# Patient Record
Sex: FEMALE | Race: WHITE | Employment: FULL TIME | URBAN - NONMETROPOLITAN AREA
[De-identification: names, ages, dates, MRNs, and addresses within clinical notes are randomized per-mention and may not be internally consistent; named-entity substitution may affect disease eponyms.]

---

## 2017-01-23 ENCOUNTER — OFFICE VISIT (OUTPATIENT)
Dept: FAMILY MEDICINE CLINIC | Age: 57
End: 2017-01-23

## 2017-01-23 ENCOUNTER — APPOINTMENT (OUTPATIENT)
Dept: GENERAL RADIOLOGY | Age: 57
End: 2017-01-23
Payer: COMMERCIAL

## 2017-01-23 ENCOUNTER — HOSPITAL ENCOUNTER (EMERGENCY)
Age: 57
Discharge: HOME | End: 2017-01-23
Attending: EMERGENCY MEDICINE
Payer: COMMERCIAL

## 2017-01-23 VITALS
WEIGHT: 186 LBS | HEART RATE: 120 BPM | HEIGHT: 66 IN | BODY MASS INDEX: 29.89 KG/M2 | RESPIRATION RATE: 18 BRPM | OXYGEN SATURATION: 97 % | TEMPERATURE: 97.4 F | DIASTOLIC BLOOD PRESSURE: 72 MMHG | SYSTOLIC BLOOD PRESSURE: 115 MMHG

## 2017-01-23 VITALS
DIASTOLIC BLOOD PRESSURE: 78 MMHG | HEIGHT: 66 IN | OXYGEN SATURATION: 98 % | HEART RATE: 125 BPM | BODY MASS INDEX: 29.99 KG/M2 | WEIGHT: 186.6 LBS | SYSTOLIC BLOOD PRESSURE: 98 MMHG | TEMPERATURE: 98.6 F

## 2017-01-23 DIAGNOSIS — R00.0 TACHYCARDIA: ICD-10-CM

## 2017-01-23 DIAGNOSIS — R53.83 OTHER FATIGUE: ICD-10-CM

## 2017-01-23 DIAGNOSIS — E86.0 DEHYDRATION: Primary | ICD-10-CM

## 2017-01-23 DIAGNOSIS — J18.9 COMMUNITY ACQUIRED PNEUMONIA: Primary | ICD-10-CM

## 2017-01-23 DIAGNOSIS — R19.7 DIARRHEA, UNSPECIFIED TYPE: ICD-10-CM

## 2017-01-23 LAB
ALBUMIN SERPL-MCNC: 3.9 G/DL (ref 3.9–4.9)
ALP BLD-CCNC: 110 U/L (ref 40–130)
ALT SERPL-CCNC: 10 U/L (ref 0–33)
ANION GAP SERPL CALCULATED.3IONS-SCNC: 19 MEQ/L (ref 7–13)
AST SERPL-CCNC: 14 U/L (ref 0–35)
BILIRUB SERPL-MCNC: 0.4 MG/DL (ref 0–1.2)
BUN BLDV-MCNC: 19 MG/DL (ref 6–20)
CALCIUM SERPL-MCNC: 9.9 MG/DL (ref 8.6–10.2)
CHLORIDE BLD-SCNC: 89 MEQ/L (ref 98–107)
CO2: 28 MEQ/L (ref 22–29)
CREAT SERPL-MCNC: 1.69 MG/DL (ref 0.5–0.9)
GFR AFRICAN AMERICAN: 37.8
GFR NON-AFRICAN AMERICAN: 31.2
GLOBULIN: 3.4 G/DL (ref 2.3–3.5)
GLUCOSE BLD-MCNC: 123 MG/DL (ref 74–109)
HCT VFR BLD CALC: 34.9 % (ref 37–47)
HEMOGLOBIN: 11.6 G/DL (ref 12–16)
MCH RBC QN AUTO: 29.9 PG (ref 27–31.3)
MCHC RBC AUTO-ENTMCNC: 33.3 % (ref 33–37)
MCV RBC AUTO: 89.5 FL (ref 82–100)
PDW BLD-RTO: 13.2 % (ref 11.5–14.5)
PLATELET # BLD: 612 K/UL (ref 130–400)
POTASSIUM SERPL-SCNC: 4.1 MEQ/L (ref 3.5–5.1)
RAPID INFLUENZA  B AGN: NEGATIVE
RAPID INFLUENZA A AGN: NEGATIVE
RBC # BLD: 3.9 M/UL (ref 4.2–5.4)
SODIUM BLD-SCNC: 136 MEQ/L (ref 132–144)
TOTAL PROTEIN: 7.3 G/DL (ref 6.4–8.1)
WBC # BLD: 11.9 K/UL (ref 4.8–10.8)

## 2017-01-23 PROCEDURE — 96365 THER/PROPH/DIAG IV INF INIT: CPT

## 2017-01-23 PROCEDURE — 2580000003 HC RX 258: Performed by: PHYSICIAN ASSISTANT

## 2017-01-23 PROCEDURE — 6360000002 HC RX W HCPCS: Performed by: PHYSICIAN ASSISTANT

## 2017-01-23 PROCEDURE — 85027 COMPLETE CBC AUTOMATED: CPT

## 2017-01-23 PROCEDURE — 36415 COLL VENOUS BLD VENIPUNCTURE: CPT

## 2017-01-23 PROCEDURE — 71020 XR CHEST STANDARD TWO VW: CPT

## 2017-01-23 PROCEDURE — 86403 PARTICLE AGGLUT ANTBDY SCRN: CPT

## 2017-01-23 PROCEDURE — 99213 OFFICE O/P EST LOW 20 MIN: CPT | Performed by: NURSE PRACTITIONER

## 2017-01-23 PROCEDURE — 99283 EMERGENCY DEPT VISIT LOW MDM: CPT

## 2017-01-23 PROCEDURE — 80053 COMPREHEN METABOLIC PANEL: CPT

## 2017-01-23 PROCEDURE — 96375 TX/PRO/DX INJ NEW DRUG ADDON: CPT

## 2017-01-23 RX ORDER — ONDANSETRON 2 MG/ML
4 INJECTION INTRAMUSCULAR; INTRAVENOUS ONCE
Status: COMPLETED | OUTPATIENT
Start: 2017-01-23 | End: 2017-01-23

## 2017-01-23 RX ORDER — 0.9 % SODIUM CHLORIDE 0.9 %
1000 INTRAVENOUS SOLUTION INTRAVENOUS ONCE
Status: COMPLETED | OUTPATIENT
Start: 2017-01-23 | End: 2017-01-23

## 2017-01-23 RX ORDER — LEVOFLOXACIN 250 MG/1
250 TABLET ORAL DAILY
Qty: 10 TABLET | Refills: 0 | Status: SHIPPED | OUTPATIENT
Start: 2017-01-23 | End: 2017-02-02

## 2017-01-23 RX ORDER — LEVOFLOXACIN 5 MG/ML
500 INJECTION, SOLUTION INTRAVENOUS ONCE
Status: COMPLETED | OUTPATIENT
Start: 2017-01-23 | End: 2017-01-23

## 2017-01-23 RX ORDER — LEVOFLOXACIN 500 MG/1
500 TABLET, FILM COATED ORAL DAILY
Qty: 7 TABLET | Refills: 0 | Status: SHIPPED | OUTPATIENT
Start: 2017-01-23 | End: 2017-01-23

## 2017-01-23 RX ADMIN — LEVOFLOXACIN 500 MG: 5 INJECTION, SOLUTION INTRAVENOUS at 19:59

## 2017-01-23 RX ADMIN — ONDANSETRON 4 MG: 2 INJECTION, SOLUTION INTRAMUSCULAR; INTRAVENOUS at 19:04

## 2017-01-23 RX ADMIN — SODIUM CHLORIDE 1000 ML: 900 INJECTION, SOLUTION INTRAVENOUS at 19:04

## 2017-01-23 ASSESSMENT — ENCOUNTER SYMPTOMS
WHEEZING: 0
VOMITING: 1
SHORTNESS OF BREATH: 0
CHANGE IN BOWEL HABIT: 1
STRIDOR: 0
SHORTNESS OF BREATH: 1
DIARRHEA: 1
NAUSEA: 1
COUGH: 1
COUGH: 1
CHEST TIGHTNESS: 0

## 2017-01-25 ENCOUNTER — PATIENT MESSAGE (OUTPATIENT)
Dept: FAMILY MEDICINE CLINIC | Age: 57
End: 2017-01-25

## 2017-01-26 RX ORDER — PRAVASTATIN SODIUM 40 MG
40 TABLET ORAL DAILY
Qty: 90 TABLET | Refills: 2 | Status: SHIPPED | OUTPATIENT
Start: 2017-01-26 | End: 2017-11-30

## 2017-02-08 ENCOUNTER — OFFICE VISIT (OUTPATIENT)
Dept: FAMILY MEDICINE CLINIC | Age: 57
End: 2017-02-08

## 2017-02-08 VITALS
OXYGEN SATURATION: 98 % | HEART RATE: 128 BPM | BODY MASS INDEX: 29.25 KG/M2 | TEMPERATURE: 98.6 F | WEIGHT: 182 LBS | SYSTOLIC BLOOD PRESSURE: 98 MMHG | HEIGHT: 66 IN | DIASTOLIC BLOOD PRESSURE: 76 MMHG

## 2017-02-08 DIAGNOSIS — I51.7 CARDIOMEGALY: ICD-10-CM

## 2017-02-08 DIAGNOSIS — R53.83 OTHER FATIGUE: ICD-10-CM

## 2017-02-08 DIAGNOSIS — R00.0 TACHYCARDIA: Primary | ICD-10-CM

## 2017-02-08 LAB
ALBUMIN SERPL-MCNC: 3.6 G/DL (ref 3.9–4.9)
ALP BLD-CCNC: 101 U/L (ref 40–130)
ALT SERPL-CCNC: 7 U/L (ref 0–33)
ANION GAP SERPL CALCULATED.3IONS-SCNC: 15 MEQ/L (ref 7–13)
AST SERPL-CCNC: 12 U/L (ref 0–35)
BASOPHILS ABSOLUTE: 0.1 K/UL (ref 0–0.2)
BASOPHILS RELATIVE PERCENT: 0.9 %
BILIRUB SERPL-MCNC: 0.4 MG/DL (ref 0–1.2)
BUN BLDV-MCNC: 22 MG/DL (ref 6–20)
CALCIUM SERPL-MCNC: 9.7 MG/DL (ref 8.6–10.2)
CHLORIDE BLD-SCNC: 92 MEQ/L (ref 98–107)
CO2: 30 MEQ/L (ref 22–29)
CREAT SERPL-MCNC: 1.53 MG/DL (ref 0.5–0.9)
EOSINOPHILS ABSOLUTE: 0.1 K/UL (ref 0–0.7)
EOSINOPHILS RELATIVE PERCENT: 1.1 %
GFR AFRICAN AMERICAN: 42.4
GFR NON-AFRICAN AMERICAN: 35
GLOBULIN: 3.3 G/DL (ref 2.3–3.5)
GLUCOSE BLD-MCNC: 118 MG/DL (ref 74–109)
HCT VFR BLD CALC: 32.7 % (ref 37–47)
HEMOGLOBIN: 10.7 G/DL (ref 12–16)
LYMPHOCYTES ABSOLUTE: 1.4 K/UL (ref 1–4.8)
LYMPHOCYTES RELATIVE PERCENT: 16.5 %
MCH RBC QN AUTO: 28.7 PG (ref 27–31.3)
MCHC RBC AUTO-ENTMCNC: 32.8 % (ref 33–37)
MCV RBC AUTO: 87.4 FL (ref 82–100)
MONOCYTES ABSOLUTE: 0.7 K/UL (ref 0.2–0.8)
MONOCYTES RELATIVE PERCENT: 8.2 %
NEUTROPHILS ABSOLUTE: 6.2 K/UL (ref 1.4–6.5)
NEUTROPHILS RELATIVE PERCENT: 73.3 %
PDW BLD-RTO: 14.2 % (ref 11.5–14.5)
PLATELET # BLD: 576 K/UL (ref 130–400)
POTASSIUM SERPL-SCNC: 3.4 MEQ/L (ref 3.5–5.1)
PRO-BNP: 701 PG/ML
RBC # BLD: 3.74 M/UL (ref 4.2–5.4)
SODIUM BLD-SCNC: 137 MEQ/L (ref 132–144)
TOTAL PROTEIN: 6.9 G/DL (ref 6.4–8.1)
TSH SERPL DL<=0.05 MIU/L-ACNC: 0.9 UIU/ML (ref 0.27–4.2)
WBC # BLD: 8.5 K/UL (ref 4.8–10.8)

## 2017-02-08 PROCEDURE — 93040 RHYTHM ECG WITH REPORT: CPT | Performed by: NURSE PRACTITIONER

## 2017-02-08 PROCEDURE — 99214 OFFICE O/P EST MOD 30 MIN: CPT | Performed by: NURSE PRACTITIONER

## 2017-02-08 ASSESSMENT — ENCOUNTER SYMPTOMS
WHEEZING: 0
SHORTNESS OF BREATH: 1
COUGH: 1

## 2017-02-15 ENCOUNTER — OFFICE VISIT (OUTPATIENT)
Dept: FAMILY MEDICINE CLINIC | Age: 57
End: 2017-02-15

## 2017-02-15 VITALS
OXYGEN SATURATION: 98 % | WEIGHT: 182.4 LBS | BODY MASS INDEX: 29.32 KG/M2 | TEMPERATURE: 97.5 F | HEIGHT: 66 IN | SYSTOLIC BLOOD PRESSURE: 110 MMHG | HEART RATE: 81 BPM | DIASTOLIC BLOOD PRESSURE: 68 MMHG

## 2017-02-15 DIAGNOSIS — N28.9 DECREASED RENAL FUNCTION: ICD-10-CM

## 2017-02-15 DIAGNOSIS — D64.9 ANEMIA, UNSPECIFIED TYPE: Primary | ICD-10-CM

## 2017-02-15 PROCEDURE — 99213 OFFICE O/P EST LOW 20 MIN: CPT | Performed by: NURSE PRACTITIONER

## 2017-02-15 RX ORDER — BENZONATATE 100 MG/1
CAPSULE ORAL
COMMUNITY
Start: 2017-02-08 | End: 2017-11-30

## 2017-02-15 ASSESSMENT — ENCOUNTER SYMPTOMS
COUGH: 1
SHORTNESS OF BREATH: 0

## 2017-03-15 LAB
ALBUMIN SERPL-MCNC: 4 G/DL
BUN / CREAT RATIO: 12
BUN BLDV-MCNC: 17 MG/DL
CALCIUM SERPL-MCNC: 9.6 MG/DL
CHLORIDE BLD-SCNC: 102 MMOL/L
CO2: 24 MMOL/L
CREAT SERPL-MCNC: 1.43 MG/DL
GLUCOSE: 96
IRON: 79
PHOSPHORUS: 3.5 MG/DL
POTASSIUM SERPL-SCNC: 5.1 MMOL/L
SODIUM BLD-SCNC: 143 MMOL/L
TOTAL IRON BINDING CAPACITY: 279
VITAMIN B-12: 418

## 2017-03-21 DIAGNOSIS — D64.9 ANEMIA, UNSPECIFIED TYPE: ICD-10-CM

## 2017-03-21 DIAGNOSIS — N28.9 DECREASED RENAL FUNCTION: ICD-10-CM

## 2017-03-21 DIAGNOSIS — D64.9 ANEMIA, UNSPECIFIED TYPE: Primary | ICD-10-CM

## 2017-04-11 ENCOUNTER — HOSPITAL ENCOUNTER (OUTPATIENT)
Dept: ULTRASOUND IMAGING | Age: 57
Discharge: HOME | End: 2017-04-11
Payer: COMMERCIAL

## 2017-04-11 DIAGNOSIS — N13.39 OTHER HYDRONEPHROSIS: ICD-10-CM

## 2017-04-11 PROCEDURE — 76775 US EXAM ABDO BACK WALL LIM: CPT

## 2017-11-13 DIAGNOSIS — F32.A DEPRESSION, UNSPECIFIED DEPRESSION TYPE: ICD-10-CM

## 2017-11-13 RX ORDER — BUPROPION HYDROCHLORIDE 150 MG/1
150 TABLET, EXTENDED RELEASE ORAL 2 TIMES DAILY
Qty: 180 TABLET | Refills: 1 | Status: SHIPPED | OUTPATIENT
Start: 2017-11-13 | End: 2018-05-22 | Stop reason: SDUPTHER

## 2017-11-13 NOTE — TELEPHONE ENCOUNTER
From: Mirian Salguero  Sent: 11/13/2017 2:14 PM EST  Subject: Medication Renewal Request    Vimal Rai.  [facility] would [facility] ref[facility] of the following medications:  buPROPion [facility] SR) 150 MG extended release tablet Rambo Leal MD]    Preferred pharmacy: [address] [place] [address] [phone] - F [phone]    Comment:

## 2017-11-30 ENCOUNTER — OFFICE VISIT (OUTPATIENT)
Dept: FAMILY MEDICINE CLINIC | Age: 57
End: 2017-11-30

## 2017-11-30 VITALS
SYSTOLIC BLOOD PRESSURE: 112 MMHG | WEIGHT: 198 LBS | HEIGHT: 66 IN | DIASTOLIC BLOOD PRESSURE: 80 MMHG | HEART RATE: 51 BPM | BODY MASS INDEX: 31.82 KG/M2 | OXYGEN SATURATION: 98 %

## 2017-11-30 DIAGNOSIS — E55.9 VITAMIN D DEFICIENCY: ICD-10-CM

## 2017-11-30 DIAGNOSIS — L67.8 BRITTLE HAIR: Primary | ICD-10-CM

## 2017-11-30 DIAGNOSIS — E78.00 HIGH CHOLESTEROL: ICD-10-CM

## 2017-11-30 DIAGNOSIS — Z23 NEEDS FLU SHOT: ICD-10-CM

## 2017-11-30 DIAGNOSIS — I10 ESSENTIAL HYPERTENSION: ICD-10-CM

## 2017-11-30 DIAGNOSIS — F32.A DEPRESSION, UNSPECIFIED DEPRESSION TYPE: ICD-10-CM

## 2017-11-30 PROCEDURE — 99213 OFFICE O/P EST LOW 20 MIN: CPT | Performed by: FAMILY MEDICINE

## 2017-11-30 PROCEDURE — 90471 IMMUNIZATION ADMIN: CPT | Performed by: FAMILY MEDICINE

## 2017-11-30 PROCEDURE — 3017F COLORECTAL CA SCREEN DOC REV: CPT | Performed by: FAMILY MEDICINE

## 2017-11-30 PROCEDURE — G8484 FLU IMMUNIZE NO ADMIN: HCPCS | Performed by: FAMILY MEDICINE

## 2017-11-30 PROCEDURE — 1036F TOBACCO NON-USER: CPT | Performed by: FAMILY MEDICINE

## 2017-11-30 PROCEDURE — G8427 DOCREV CUR MEDS BY ELIG CLIN: HCPCS | Performed by: FAMILY MEDICINE

## 2017-11-30 PROCEDURE — G8417 CALC BMI ABV UP PARAM F/U: HCPCS | Performed by: FAMILY MEDICINE

## 2017-11-30 PROCEDURE — 90688 IIV4 VACCINE SPLT 0.5 ML IM: CPT | Performed by: FAMILY MEDICINE

## 2017-11-30 PROCEDURE — 3014F SCREEN MAMMO DOC REV: CPT | Performed by: FAMILY MEDICINE

## 2017-11-30 ASSESSMENT — PATIENT HEALTH QUESTIONNAIRE - PHQ9
2. FEELING DOWN, DEPRESSED OR HOPELESS: 0
SUM OF ALL RESPONSES TO PHQ QUESTIONS 1-9: 1
1. LITTLE INTEREST OR PLEASURE IN DOING THINGS: 1
SUM OF ALL RESPONSES TO PHQ9 QUESTIONS 1 & 2: 1

## 2017-11-30 NOTE — PROGRESS NOTES
Vaccine Information Sheet, \"Influenza - Inactivated\"  given to Allyson Hale, or parent/legal guardian of  Allyson Hale and verbalized understanding. Patient responses:    Have you ever had a reaction to a flu vaccine? No  Are you able to eat eggs without adverse effects? Yes  Do you have any current illness? No  Have you ever had Guillian [place] Syndrome? No    Flu vaccine given per order. Please see immunization tab. After obtaining consent, and per orders of Dr. Jenifer Dumont, injection of flu given in Right deltoid by Apurva Cassette. Patient instructed to remain in clinic for 20 minutes afterwards, and to report any adverse reaction to me immediately.

## 2018-03-06 NOTE — PROGRESS NOTES
Chief Complaint   Patient presents with    Annual Exam       HPI:  Yfn Felix is a 62 y.o. female  Here for checkup      Hx ACE cough  Takes lopressor, pravachol, amlodipine, hctz  Following with cardio annually  Dr. Akhil Brooks checks labs for her annually    HM UTD  Needs flu shot    wellbutrin fine for her mood    Brittle hair  Wants thyroid checked        Past Medical History:   Diagnosis Date    ACE-inhibitor cough     Colon polyp     Depression     Diverticulosis     Family history of ischemic heart disease     High cholesterol     Hypertension     Palpitations     Vaginal dryness      Past Surgical History:   Procedure Laterality Date    BACK SURGERY      disk removal    CARDIAC CATHETERIZATION      Had infusion of wrong fluids with ablation. ..flash pulmonary edema. Cleven Ewings on vent x 4 hours in ICU. ..cardiac cath to follow negative    ENDOMETRIAL ABLATION      TONSILLECTOMY      WISDOM TOOTH EXTRACTION       Family History   Problem Relation Age of Onset    High Blood Pressure Mother     Cancer Father      skin, pancratic, espogas    Heart Disease Father     Cancer Maternal Grandmother      breast    High Blood Pressure Maternal Grandmother     High Blood Pressure Brother     Diabetes Other      Social History     Social History    Marital status:      Spouse name: N/A    Number of children: N/A    Years of education: N/A     Occupational History    accounts payable      Social History Main Topics    Smoking status: Never Smoker    Smokeless tobacco: Never Used    Alcohol use 0.0 oz/week      Comment: social    Drug use: No    Sexual activity: No     Other Topics Concern    None     Social History Narrative    None     Current Outpatient Prescriptions   Medication Sig Dispense Refill    Iron Combinations (IRON COMPLEX PO) Take by mouth      BIOTIN MAXIMUM PO Take by mouth      vitamin D (CHOLECALCIFEROL) 1000 UNIT TABS tablet Take 1,000 Units by mouth daily      buPROPion (WELLBUTRIN SR) 150 MG extended release tablet Take 1 tablet by mouth 2 times daily 180 tablet 1    metoprolol tartrate (LOPRESSOR) 25 MG tablet       Magnesium 300 MG CAPS Take by mouth      aspirin 81 MG tablet Take 81 mg by mouth daily      calcium carbonate (OSCAL) 500 MG TABS tablet Take 500 mg by mouth daily      amLODIPine (NORVASC) 5 MG tablet       hydrochlorothiazide (HYDRODIURIL) 25 MG tablet       Multiple Vitamins-Minerals (MULTI COMPLETE PO) Take 1 tablet by mouth daily. No current facility-administered medications for this visit. Allergies   Allergen Reactions    Ace Inhibitors Other (See Comments)     Cough           Discussed preventative screens. Review of Systems:   General ROS: negative for - nausea, vomiting, chills, fatigue, fever, malaise, weight gain or weight loss  Respiratory ROS: no cough, shortness of breath, or wheezing  Cardiovascular ROS: no chest pain or dyspnea on exertion  Gastrointestinal ROS: no abdominal pain, change in bowel habits, or black or bloody stools  Genito-Urinary ROS: no dysuria, trouble voiding, or hematuria  Musculoskeletal ROS: negative for - gait disturbance, joint pain or joint stiffness  Neurological ROS: negative for - behavioral changes, memory loss, numbness/tingling, tremors or weakness    In general patient otherwise reports feeling well. Physical Exam:  /80 (Site: Left Arm)   Pulse 51   Ht 5' 6\" (1.676 m)   Wt 198 lb (89.8 kg)   SpO2 98%   Breastfeeding? No   BMI 31.96 kg/m²     Gen: Well, NAD, Alert, Oriented x 3   HEENT: EOMI, eyes clear, MMM  Skin: without rash or jaundice, thinning hair  Neck: no significant lymphadenopathy or thyromegaly  Lungs: CTA B w/out Rales/Wheezes/Rhonchi, Good respiratory effort   Heart: RRR, S1S2, w/out M/R/G, non-displaced PMI   Abdomen: Soft NT/ND, w/out R/G, w/ +BSx4   Ext: No C/C/E Bilaterally.    Neuro: Neurovascularly intact w/ Sensory/Motor intact UE/LE Bilaterally. Psych: generally euthymic      A&P  1. Brittle hair  TSH without Reflex    TSH without Reflex    Thyroid Peroxidase Antibody    T4, Free    T3, Free   2. Essential hypertension  CANCELED: CBC    CANCELED: Comprehensive Metabolic Panel    CANCELED: Lipid Panel   3. Depression, unspecified depression type  TSH without Reflex    TSH without Reflex    Thyroid Peroxidase Antibody    T4, Free    T3, Free   4. Vitamin D deficiency  Vitamin D 25 Hydroxy    Vitamin D 25 Hydroxy   5. High cholesterol  CANCELED: Comprehensive Metabolic Panel    CANCELED: Lipid Panel   6. Needs flu shot  INFLUENZA, QUADV, 6 MO AND OLDER, IM, MDV, 0.5ML (FLULAVAL QUADV)     The current medical regimen is effective;  continue present plan and medications.     Return for fasting labs    She will follow up here and can return to Cardiology as needed  She saw Dr. Elo Martins and had good checkup      Minerva Leung MD normal...

## 2018-03-28 ENCOUNTER — OFFICE VISIT (OUTPATIENT)
Dept: FAMILY MEDICINE CLINIC | Age: 58
End: 2018-03-28
Payer: COMMERCIAL

## 2018-03-28 VITALS
BODY MASS INDEX: 30.67 KG/M2 | TEMPERATURE: 98.5 F | DIASTOLIC BLOOD PRESSURE: 72 MMHG | RESPIRATION RATE: 12 BRPM | HEART RATE: 104 BPM | WEIGHT: 190 LBS | SYSTOLIC BLOOD PRESSURE: 102 MMHG | OXYGEN SATURATION: 98 %

## 2018-03-28 DIAGNOSIS — J06.9 UPPER RESPIRATORY TRACT INFECTION, UNSPECIFIED TYPE: Primary | ICD-10-CM

## 2018-03-28 PROCEDURE — G8417 CALC BMI ABV UP PARAM F/U: HCPCS | Performed by: NURSE PRACTITIONER

## 2018-03-28 PROCEDURE — G8482 FLU IMMUNIZE ORDER/ADMIN: HCPCS | Performed by: NURSE PRACTITIONER

## 2018-03-28 PROCEDURE — 3014F SCREEN MAMMO DOC REV: CPT | Performed by: NURSE PRACTITIONER

## 2018-03-28 PROCEDURE — 1036F TOBACCO NON-USER: CPT | Performed by: NURSE PRACTITIONER

## 2018-03-28 PROCEDURE — 99213 OFFICE O/P EST LOW 20 MIN: CPT | Performed by: NURSE PRACTITIONER

## 2018-03-28 PROCEDURE — G8427 DOCREV CUR MEDS BY ELIG CLIN: HCPCS | Performed by: NURSE PRACTITIONER

## 2018-03-28 PROCEDURE — 3017F COLORECTAL CA SCREEN DOC REV: CPT | Performed by: NURSE PRACTITIONER

## 2018-03-28 RX ORDER — ATORVASTATIN CALCIUM 20 MG/1
TABLET, FILM COATED ORAL
COMMUNITY
Start: 2018-01-27 | End: 2019-11-21

## 2018-03-28 RX ORDER — AZITHROMYCIN 250 MG/1
TABLET, FILM COATED ORAL
Qty: 1 PACKET | Refills: 0 | Status: SHIPPED | OUTPATIENT
Start: 2018-03-28 | End: 2019-11-21

## 2018-04-15 ASSESSMENT — ENCOUNTER SYMPTOMS
WHEEZING: 0
SHORTNESS OF BREATH: 0
SWOLLEN GLANDS: 0
ANAL BLEEDING: 0
SINUS PAIN: 0
ABDOMINAL DISTENTION: 0
RHINORRHEA: 1
VOMITING: 0
DIARRHEA: 0
COUGH: 1
BLOOD IN STOOL: 0
CONSTIPATION: 0
CHEST TIGHTNESS: 0
SORE THROAT: 1
NAUSEA: 0
ABDOMINAL PAIN: 0

## 2018-05-22 DIAGNOSIS — F32.A DEPRESSION, UNSPECIFIED DEPRESSION TYPE: ICD-10-CM

## 2018-05-23 RX ORDER — BUPROPION HYDROCHLORIDE 150 MG/1
TABLET, EXTENDED RELEASE ORAL
Qty: 180 TABLET | Refills: 2 | Status: SHIPPED | OUTPATIENT
Start: 2018-05-23 | End: 2019-11-21

## 2019-11-21 ENCOUNTER — OFFICE VISIT (OUTPATIENT)
Dept: OBGYN CLINIC | Age: 59
End: 2019-11-21
Payer: COMMERCIAL

## 2019-11-21 VITALS
DIASTOLIC BLOOD PRESSURE: 82 MMHG | SYSTOLIC BLOOD PRESSURE: 138 MMHG | HEIGHT: 66 IN | WEIGHT: 184 LBS | BODY MASS INDEX: 29.57 KG/M2

## 2019-11-21 DIAGNOSIS — Z11.51 SCREENING FOR HUMAN PAPILLOMAVIRUS: ICD-10-CM

## 2019-11-21 DIAGNOSIS — Z01.419 WOMEN'S ANNUAL ROUTINE GYNECOLOGICAL EXAMINATION: Primary | ICD-10-CM

## 2019-11-21 DIAGNOSIS — Z12.31 SCREENING MAMMOGRAM, ENCOUNTER FOR: ICD-10-CM

## 2019-11-21 PROCEDURE — 99386 PREV VISIT NEW AGE 40-64: CPT | Performed by: OBSTETRICS & GYNECOLOGY

## 2019-11-21 ASSESSMENT — ENCOUNTER SYMPTOMS
EYES NEGATIVE: 1
ABDOMINAL DISTENTION: 0
BLOOD IN STOOL: 0
ANAL BLEEDING: 0
DIARRHEA: 0
CONSTIPATION: 0
NAUSEA: 0
RECTAL PAIN: 0
ALLERGIC/IMMUNOLOGIC NEGATIVE: 1
RESPIRATORY NEGATIVE: 1
ABDOMINAL PAIN: 0
VOMITING: 0

## 2019-12-02 DIAGNOSIS — Z11.51 SCREENING FOR HUMAN PAPILLOMAVIRUS: ICD-10-CM

## 2019-12-02 DIAGNOSIS — Z01.419 WOMEN'S ANNUAL ROUTINE GYNECOLOGICAL EXAMINATION: ICD-10-CM

## 2020-01-22 ENCOUNTER — HOSPITAL ENCOUNTER (EMERGENCY)
Age: 60
Discharge: HOME | End: 2020-01-22
Payer: COMMERCIAL

## 2020-01-22 ENCOUNTER — APPOINTMENT (OUTPATIENT)
Dept: GENERAL RADIOLOGY | Age: 60
End: 2020-01-22
Payer: COMMERCIAL

## 2020-01-22 VITALS
TEMPERATURE: 97.4 F | OXYGEN SATURATION: 98 % | HEART RATE: 85 BPM | WEIGHT: 175 LBS | DIASTOLIC BLOOD PRESSURE: 75 MMHG | SYSTOLIC BLOOD PRESSURE: 143 MMHG | HEIGHT: 66 IN | BODY MASS INDEX: 28.12 KG/M2 | RESPIRATION RATE: 16 BRPM

## 2020-01-22 LAB
INFLUENZA A BY PCR: NEGATIVE
INFLUENZA B BY PCR: NEGATIVE

## 2020-01-22 PROCEDURE — 71046 X-RAY EXAM CHEST 2 VIEWS: CPT

## 2020-01-22 PROCEDURE — 99283 EMERGENCY DEPT VISIT LOW MDM: CPT

## 2020-01-22 PROCEDURE — 87502 INFLUENZA DNA AMP PROBE: CPT

## 2020-01-22 RX ORDER — BENZONATATE 100 MG/1
200 CAPSULE ORAL 3 TIMES DAILY PRN
Qty: 30 CAPSULE | Refills: 0 | Status: SHIPPED | OUTPATIENT
Start: 2020-01-22

## 2020-01-22 ASSESSMENT — ENCOUNTER SYMPTOMS
GASTROINTESTINAL NEGATIVE: 1
EYES NEGATIVE: 1
COUGH: 1

## 2020-01-22 ASSESSMENT — PAIN DESCRIPTION - PAIN TYPE: TYPE: ACUTE PAIN

## 2020-01-22 ASSESSMENT — PAIN DESCRIPTION - LOCATION: LOCATION: GENERALIZED

## 2020-01-22 ASSESSMENT — PAIN SCALES - GENERAL: PAINLEVEL_OUTOF10: 2

## 2020-01-22 ASSESSMENT — PAIN DESCRIPTION - DESCRIPTORS: DESCRIPTORS: ACHING

## 2020-01-22 ASSESSMENT — PAIN DESCRIPTION - FREQUENCY: FREQUENCY: CONTINUOUS

## 2020-01-22 NOTE — ED TRIAGE NOTES
Pt c/o a cough, congestion, general body aches and nausea for the past week, Pt is A&OX3, calm, ambulatory, afebrile, breathes are equal and unlabored, dry cough.

## 2020-01-22 NOTE — ED PROVIDER NOTES
concerning symptoms arise. Patient states her symptoms have been improving over the last few days compared to 5 days ago therefore I feel conservative treatment is appropriate at this time. Rest and plenty of fluids. Return here as needed. Patient verbalizes understanding of plan at discharge and has no further questions. Patient will be placed on Tessalon Perles to help with her cough at night. PROCEDURES:  Unless otherwise noted below, none     Procedures      FINAL IMPRESSION      1.  Acute upper respiratory infection          DISPOSITION/PLAN   DISPOSITION Decision To Discharge 01/22/2020 06:40:49 PM          Caitlin Smith PA-C (electronically signed)  Attending Emergency Physician  [facility]  01/22/20 2761

## 2020-10-11 ENCOUNTER — TELEPHONE (OUTPATIENT)
Dept: FAMILY MEDICINE CLINIC | Age: 60
End: 2020-10-11

## 2022-04-01 ENCOUNTER — OFFICE VISIT (OUTPATIENT)
Dept: FAMILY MEDICINE CLINIC | Age: 62
End: 2022-04-01
Payer: COMMERCIAL

## 2022-04-01 VITALS
SYSTOLIC BLOOD PRESSURE: 136 MMHG | DIASTOLIC BLOOD PRESSURE: 88 MMHG | BODY MASS INDEX: 32.14 KG/M2 | HEART RATE: 42 BPM | WEIGHT: 200 LBS | OXYGEN SATURATION: 98 % | TEMPERATURE: 98.1 F | HEIGHT: 66 IN

## 2022-04-01 DIAGNOSIS — I49.9 IRREGULAR HEART RATE: Primary | ICD-10-CM

## 2022-04-01 DIAGNOSIS — J10.1 INFLUENZA B: ICD-10-CM

## 2022-04-01 PROCEDURE — 93000 ELECTROCARDIOGRAM COMPLETE: CPT | Performed by: FAMILY MEDICINE

## 2022-04-01 PROCEDURE — 99213 OFFICE O/P EST LOW 20 MIN: CPT | Performed by: FAMILY MEDICINE

## 2022-04-01 SDOH — ECONOMIC STABILITY: TRANSPORTATION INSECURITY
IN THE PAST 12 MONTHS, HAS LACK OF TRANSPORTATION KEPT YOU FROM MEETINGS, WORK, OR FROM GETTING THINGS NEEDED FOR DAILY LIVING?: NO

## 2022-04-01 SDOH — ECONOMIC STABILITY: FOOD INSECURITY: WITHIN THE PAST 12 MONTHS, THE FOOD YOU BOUGHT JUST DIDN'T LAST AND YOU DIDN'T HAVE MONEY TO GET MORE.: NEVER TRUE

## 2022-04-01 SDOH — ECONOMIC STABILITY: TRANSPORTATION INSECURITY
IN THE PAST 12 MONTHS, HAS THE LACK OF TRANSPORTATION KEPT YOU FROM MEDICAL APPOINTMENTS OR FROM GETTING MEDICATIONS?: NO

## 2022-04-01 SDOH — ECONOMIC STABILITY: FOOD INSECURITY: WITHIN THE PAST 12 MONTHS, YOU WORRIED THAT YOUR FOOD WOULD RUN OUT BEFORE YOU GOT MONEY TO BUY MORE.: NEVER TRUE

## 2022-04-01 ASSESSMENT — PATIENT HEALTH QUESTIONNAIRE - PHQ9
4. FEELING TIRED OR HAVING LITTLE ENERGY: 0
SUM OF ALL RESPONSES TO PHQ QUESTIONS 1-9: 0
8. MOVING OR SPEAKING SO SLOWLY THAT OTHER PEOPLE COULD HAVE NOTICED. OR THE OPPOSITE, BEING SO FIGETY OR RESTLESS THAT YOU HAVE BEEN MOVING AROUND A LOT MORE THAN USUAL: 0
2. FEELING DOWN, DEPRESSED OR HOPELESS: 0
SUM OF ALL RESPONSES TO PHQ QUESTIONS 1-9: 0
SUM OF ALL RESPONSES TO PHQ9 QUESTIONS 1 & 2: 0
5. POOR APPETITE OR OVEREATING: 0
7. TROUBLE CONCENTRATING ON THINGS, SUCH AS READING THE NEWSPAPER OR WATCHING TELEVISION: 0
9. THOUGHTS THAT YOU WOULD BE BETTER OFF DEAD, OR OF HURTING YOURSELF: 0
SUM OF ALL RESPONSES TO PHQ QUESTIONS 1-9: 0
3. TROUBLE FALLING OR STAYING ASLEEP: 0
6. FEELING BAD ABOUT YOURSELF - OR THAT YOU ARE A FAILURE OR HAVE LET YOURSELF OR YOUR FAMILY DOWN: 0
10. IF YOU CHECKED OFF ANY PROBLEMS, HOW DIFFICULT HAVE THESE PROBLEMS MADE IT FOR YOU TO DO YOUR WORK, TAKE CARE OF THINGS AT HOME, OR GET ALONG WITH OTHER PEOPLE: 0
SUM OF ALL RESPONSES TO PHQ QUESTIONS 1-9: 0
1. LITTLE INTEREST OR PLEASURE IN DOING THINGS: 0

## 2022-04-01 ASSESSMENT — SOCIAL DETERMINANTS OF HEALTH (SDOH): HOW HARD IS IT FOR YOU TO PAY FOR THE VERY BASICS LIKE FOOD, HOUSING, MEDICAL CARE, AND HEATING?: NOT HARD AT ALL

## 2022-04-01 NOTE — PROGRESS NOTES
Chief Complaint   Patient presents with    Influenza     positive for flu B, on meds and sinus    Irregular Heart Beat     [facility] said had irregluar heart rate       HPI:  Kurt Ornelas is a 64 y.o. female    Was at urgent care yesterday   Positive flu B    On tamiflu and augmentin  OTC supportive care    Irregular HR on exam by NP at urgent care  Wanted her seen here for EKG    Previously took lopressor, pravachol, amlodipine, hctz  Following with cardio annually  Dr. Shelby Delacruz checks labs for her annually    [facility]            Past Medical History:   Diagnosis Date    ACE-inhibitor cough     Colon polyp     Depression     Diverticulosis     Family history of ischemic heart disease     High cholesterol     Hypertension     Palpitations     Vaginal dryness      Past Surgical History:   Procedure Laterality Date    BACK SURGERY      disk removal    CARDIAC CATHETERIZATION      Had infusion of wrong fluids with ablation. ..flash pulmonary edema. Hiram Billing on vent x 4 hours in ICU. ..cardiac cath to follow negative    ENDOMETRIAL ABLATION      TONSILLECTOMY      WISDOM TOOTH EXTRACTION       Family History   Problem Relation Age of Onset    High Blood Pressure Mother     Cancer Father         skin, pancratic, espogas    Heart Disease Father     Cancer Maternal Grandmother         breast    High Blood Pressure Maternal Grandmother     High Blood Pressure Brother     Diabetes Other      Social History     Socioeconomic History    Marital status:      Spouse name: None    Number of children: None    Years of education: None    Highest education level: None   Occupational History    Occupation: accounts payable   Tobacco Use    Smoking status: Never Smoker    Smokeless tobacco: Never Used   Substance and Sexual Activity    Alcohol use:  Yes     Alcohol/week: 0.0 standard drinks     Comment: social    Drug use: No    Sexual activity: Not Currently     Partners: Male   Other

## 2022-11-23 ENCOUNTER — APPOINTMENT (OUTPATIENT)
Dept: GENERAL RADIOLOGY | Age: 62
DRG: 552 | End: 2022-11-23
Payer: COMMERCIAL

## 2022-11-23 ENCOUNTER — HOSPITAL ENCOUNTER (INPATIENT)
Age: 62
LOS: 6 days | Discharge: HOME | DRG: 552 | End: 2022-12-01
Attending: INTERNAL MEDICINE | Admitting: INTERNAL MEDICINE
Payer: COMMERCIAL

## 2022-11-23 DIAGNOSIS — R53.1 GENERALIZED WEAKNESS: ICD-10-CM

## 2022-11-23 DIAGNOSIS — E86.0 DEHYDRATION: Primary | ICD-10-CM

## 2022-11-23 DIAGNOSIS — R20.2 PARESTHESIA OF FOOT, BILATERAL: ICD-10-CM

## 2022-11-23 LAB
ADENOVIRUS BY PCR: NOT DETECTED
ALBUMIN SERPL-MCNC: 2.9 G/DL (ref 3.5–4.6)
ALBUMIN SERPL-MCNC: 3.2 G/DL (ref 3.5–4.6)
ALP BLD-CCNC: 100 U/L (ref 40–130)
ALP BLD-CCNC: 86 U/L (ref 40–130)
ALT SERPL-CCNC: 11 U/L (ref 0–33)
ALT SERPL-CCNC: 13 U/L (ref 0–33)
ANION GAP SERPL CALCULATED.3IONS-SCNC: 18 MEQ/L (ref 9–15)
AST SERPL-CCNC: 14 U/L (ref 0–35)
AST SERPL-CCNC: 23 U/L (ref 0–35)
BACTERIA: ABNORMAL /HPF
BASOPHILS ABSOLUTE: 0 K/UL (ref 0–0.2)
BASOPHILS RELATIVE PERCENT: 0.1 %
BILIRUB SERPL-MCNC: 0.5 MG/DL (ref 0.2–0.7)
BILIRUB SERPL-MCNC: 0.6 MG/DL (ref 0.2–0.7)
BILIRUBIN DIRECT: <0.2 MG/DL (ref 0–0.4)
BILIRUBIN URINE: NEGATIVE
BILIRUBIN, INDIRECT: ABNORMAL MG/DL (ref 0–0.6)
BLOOD, URINE: ABNORMAL
BORDETELLA PARAPERTUSSIS BY PCR: NOT DETECTED
BORDETELLA PERTUSSIS BY PCR: NOT DETECTED
BUN BLDV-MCNC: 31 MG/DL (ref 8–23)
C-REACTIVE PROTEIN: 230.4 MG/L (ref 0–5)
CALCIUM SERPL-MCNC: 9 MG/DL (ref 8.5–9.9)
CHLAMYDOPHILIA PNEUMONIAE BY PCR: NOT DETECTED
CHLORIDE BLD-SCNC: 100 MEQ/L (ref 95–107)
CLARITY: ABNORMAL
CO2: 19 MEQ/L (ref 20–31)
COLOR: YELLOW
CORONAVIRUS 229E BY PCR: NOT DETECTED
CORONAVIRUS HKU1 BY PCR: NOT DETECTED
CORONAVIRUS NL63 BY PCR: NOT DETECTED
CORONAVIRUS OC43 BY PCR: NOT DETECTED
CREAT SERPL-MCNC: 1.61 MG/DL (ref 0.5–0.9)
EOSINOPHILS ABSOLUTE: 0 K/UL (ref 0–0.7)
EOSINOPHILS RELATIVE PERCENT: 0 %
EPITHELIAL CELLS, UA: ABNORMAL /HPF (ref 0–5)
GFR SERPL CREATININE-BSD FRML MDRD: 35.8 ML/MIN/{1.73_M2}
GLOBULIN: 4 G/DL (ref 2.3–3.5)
GLUCOSE BLD-MCNC: 113 MG/DL (ref 70–99)
GLUCOSE BLD-MCNC: 93 MG/DL (ref 70–99)
GLUCOSE URINE: NEGATIVE MG/DL
HCT VFR BLD CALC: 36.8 % (ref 37–47)
HEMOGLOBIN: 12 G/DL (ref 12–16)
HUMAN METAPNEUMOVIRUS BY PCR: NOT DETECTED
HUMAN RHINOVIRUS/ENTEROVIRUS BY PCR: NOT DETECTED
HYALINE CASTS: ABNORMAL /HPF (ref 0–5)
INFLUENZA A BY PCR: NEGATIVE
INFLUENZA A BY PCR: NOT DETECTED
INFLUENZA B BY PCR: NEGATIVE
INFLUENZA B BY PCR: NOT DETECTED
KETONES, URINE: 40 MG/DL
LACTIC ACID: 1.6 MMOL/L (ref 0.5–2.2)
LEUKOCYTE ESTERASE, URINE: ABNORMAL
LYMPHOCYTES ABSOLUTE: 0.4 K/UL (ref 1–4.8)
LYMPHOCYTES RELATIVE PERCENT: 2.7 %
MCH RBC QN AUTO: 31.1 PG (ref 27–31.3)
MCHC RBC AUTO-ENTMCNC: 32.5 % (ref 33–37)
MCV RBC AUTO: 95.8 FL (ref 79.4–94.8)
MONOCYTES ABSOLUTE: 0.9 K/UL (ref 0.2–0.8)
MONOCYTES RELATIVE PERCENT: 5.5 %
MYCOPLASMA PNEUMONIAE BY PCR: NOT DETECTED
NEUTROPHILS ABSOLUTE: 14.6 K/UL (ref 1.4–6.5)
NEUTROPHILS RELATIVE PERCENT: 91.7 %
NITRITE, URINE: NEGATIVE
PARAINFLUENZA VIRUS 1 BY PCR: NOT DETECTED
PARAINFLUENZA VIRUS 2 BY PCR: NOT DETECTED
PARAINFLUENZA VIRUS 3 BY PCR: NOT DETECTED
PARAINFLUENZA VIRUS 4 BY PCR: NOT DETECTED
PDW BLD-RTO: 13.6 % (ref 11.5–14.5)
PERFORMED ON: NORMAL
PH UA: 5.5 (ref 5–9)
PLATELET # BLD: 365 K/UL (ref 130–400)
POTASSIUM SERPL-SCNC: 4.7 MEQ/L (ref 3.4–4.9)
PROTEIN UA: 30 MG/DL
RBC # BLD: 3.85 M/UL (ref 4.2–5.4)
RBC UA: ABNORMAL /HPF (ref 0–2)
RESPIRATORY SYNCYTIAL VIRUS BY PCR: NOT DETECTED
SARS-COV-2, NAAT: NOT DETECTED
SARS-COV-2, PCR: NOT DETECTED
SEDIMENTATION RATE, ERYTHROCYTE: 95 MM (ref 0–30)
SODIUM BLD-SCNC: 137 MEQ/L (ref 135–144)
SPECIFIC GRAVITY UA: 1.02 (ref 1–1.03)
TOTAL CK: 123 U/L (ref 0–170)
TOTAL PROTEIN: 6.4 G/DL (ref 6.3–8)
TOTAL PROTEIN: 7.2 G/DL (ref 6.3–8)
TROPONIN: <0.01 NG/ML (ref 0–0.01)
TSH SERPL DL<=0.05 MIU/L-ACNC: 0.39 UIU/ML (ref 0.44–3.86)
URIC ACID, SERUM: 8.5 MG/DL (ref 2.4–5.7)
UROBILINOGEN, URINE: 1 E.U./DL
WBC # BLD: 15.9 K/UL (ref 4.8–10.8)
WBC UA: ABNORMAL /HPF (ref 0–5)

## 2022-11-23 PROCEDURE — 83605 ASSAY OF LACTIC ACID: CPT

## 2022-11-23 PROCEDURE — 87186 SC STD MICRODIL/AGAR DIL: CPT

## 2022-11-23 PROCEDURE — 71045 X-RAY EXAM CHEST 1 VIEW: CPT

## 2022-11-23 PROCEDURE — 2580000003 HC RX 258: Performed by: INTERNAL MEDICINE

## 2022-11-23 PROCEDURE — 96361 HYDRATE IV INFUSION ADD-ON: CPT

## 2022-11-23 PROCEDURE — G0378 HOSPITAL OBSERVATION PER HR: HCPCS

## 2022-11-23 PROCEDURE — 2580000003 HC RX 258: Performed by: PERSONAL EMERGENCY RESPONSE ATTENDANT

## 2022-11-23 PROCEDURE — 96360 HYDRATION IV INFUSION INIT: CPT

## 2022-11-23 PROCEDURE — 87088 URINE BACTERIA CULTURE: CPT

## 2022-11-23 PROCEDURE — 80053 COMPREHEN METABOLIC PANEL: CPT

## 2022-11-23 PROCEDURE — 0202U NFCT DS 22 TRGT SARS-COV-2: CPT

## 2022-11-23 PROCEDURE — 87635 SARS-COV-2 COVID-19 AMP PRB: CPT

## 2022-11-23 PROCEDURE — 99285 EMERGENCY DEPT VISIT HI MDM: CPT

## 2022-11-23 PROCEDURE — 2580000003 HC RX 258: Performed by: PHYSICIAN ASSISTANT

## 2022-11-23 PROCEDURE — 87502 INFLUENZA DNA AMP PROBE: CPT

## 2022-11-23 PROCEDURE — 82550 ASSAY OF CK (CPK): CPT

## 2022-11-23 PROCEDURE — 81001 URINALYSIS AUTO W/SCOPE: CPT

## 2022-11-23 PROCEDURE — 36415 COLL VENOUS BLD VENIPUNCTURE: CPT

## 2022-11-23 PROCEDURE — 87086 URINE CULTURE/COLONY COUNT: CPT

## 2022-11-23 PROCEDURE — 84484 ASSAY OF TROPONIN QUANT: CPT

## 2022-11-23 PROCEDURE — 85025 COMPLETE CBC W/AUTO DIFF WBC: CPT

## 2022-11-23 RX ORDER — ATORVASTATIN CALCIUM 20 MG/1
20 TABLET, FILM COATED ORAL DAILY
COMMUNITY

## 2022-11-23 RX ORDER — PREDNISONE 20 MG/1
40 TABLET ORAL DAILY
Status: DISCONTINUED | OUTPATIENT
Start: 2022-11-24 | End: 2022-12-01 | Stop reason: HOSPADM

## 2022-11-23 RX ORDER — SODIUM CHLORIDE, SODIUM LACTATE, POTASSIUM CHLORIDE, CALCIUM CHLORIDE 600; 310; 30; 20 MG/100ML; MG/100ML; MG/100ML; MG/100ML
INJECTION, SOLUTION INTRAVENOUS CONTINUOUS
Status: DISCONTINUED | OUTPATIENT
Start: 2022-11-23 | End: 2022-11-30

## 2022-11-23 RX ORDER — 0.9 % SODIUM CHLORIDE 0.9 %
1000 INTRAVENOUS SOLUTION INTRAVENOUS ONCE
Status: COMPLETED | OUTPATIENT
Start: 2022-11-23 | End: 2022-11-23

## 2022-11-23 RX ORDER — HYDRALAZINE HYDROCHLORIDE 25 MG/1
25 TABLET, FILM COATED ORAL 2 TIMES DAILY
COMMUNITY

## 2022-11-23 RX ORDER — SODIUM CHLORIDE 9 MG/ML
INJECTION, SOLUTION INTRAVENOUS CONTINUOUS
Status: DISCONTINUED | OUTPATIENT
Start: 2022-11-23 | End: 2022-11-24

## 2022-11-23 RX ADMIN — SODIUM CHLORIDE, POTASSIUM CHLORIDE, SODIUM LACTATE AND CALCIUM CHLORIDE: 600; 310; 30; 20 INJECTION, SOLUTION INTRAVENOUS at 22:07

## 2022-11-23 RX ADMIN — SODIUM CHLORIDE 1000 ML: 9 INJECTION, SOLUTION INTRAVENOUS at 15:21

## 2022-11-23 RX ADMIN — SODIUM CHLORIDE: 9 INJECTION, SOLUTION INTRAVENOUS at 18:32

## 2022-11-23 RX ADMIN — SODIUM CHLORIDE 1000 ML: 9 INJECTION, SOLUTION INTRAVENOUS at 17:07

## 2022-11-23 ASSESSMENT — ENCOUNTER SYMPTOMS
ABDOMINAL DISTENTION: 0
SORE THROAT: 0
EYE DISCHARGE: 0
STRIDOR: 0
EYES NEGATIVE: 1
ABDOMINAL PAIN: 0
GASTROINTESTINAL NEGATIVE: 1
WHEEZING: 0
CONSTIPATION: 0
ALLERGIC/IMMUNOLOGIC NEGATIVE: 1
COLOR CHANGE: 0
SHORTNESS OF BREATH: 0
RHINORRHEA: 0
COUGH: 0

## 2022-11-23 ASSESSMENT — PAIN SCALES - GENERAL: PAINLEVEL_OUTOF10: 5

## 2022-11-23 ASSESSMENT — PAIN DESCRIPTION - LOCATION: LOCATION: FOOT

## 2022-11-23 ASSESSMENT — LIFESTYLE VARIABLES
HOW MANY STANDARD DRINKS CONTAINING ALCOHOL DO YOU HAVE ON A TYPICAL DAY: PATIENT DOES NOT DRINK
HOW OFTEN DO YOU HAVE A DRINK CONTAINING ALCOHOL: NEVER

## 2022-11-23 NOTE — ED PROVIDER NOTES
[address] [facility] ED  eMERGENCY dEPARTMENT eNCOUnter      Pt Name: Eduardo Landin  MRN: [id number]  Armstrongfpb 1960  Date of evaluation: 11/23/2022  Provider: Mary Beth Cohen 0635       Chief Complaint   Patient presents with    Illness     Pt has been feeling sick for 3 days (pt having chills, fever, and diarrhea). Pt has gotten out of bed for 3 days          HISTORY OF PRESENT ILLNESS   (Location/Symptom, Timing/Onset,Context/Setting, Quality, Duration, Modifying Factors, Severity)  Note limiting factors. Eduardo Landin is a 58 y.o. female who presents to the emergency department with complaint of generally not feeling well for the last 3 days, patient has chills, fevers, diarrhea, she states decreased appetite, but has eaten and drank fluids today. She states she she feels fatigued, has not gotten out of bed for the last 3 days, she denies any chest pain, no shortness of breath, no nausea vomit, no urinary complaints. Patient also complains of generalized bilateral foot pain which she states is new for her over the last couple days, she denies any acute injury. Past medical significant for hypertension, depression, hyperlipidemia. For 5 days patient has been having pain and paresthesias to bilateral feet. She has never had this previously. For 3 days she has been having chills, dry cough, shortness of breath without wheezing, and dysuria, decreased appetite, generalized weakness, decreased ambulation. Today she started with loose stools. She denies ill contacts, fevers, headaches, chest pain, abdominal pain, nausea, vomiting. HPI    NursingNotes were reviewed. REVIEW OF SYSTEMS    (2-9 systems for level 4, 10 or more for level 5)     Review of Systems   Constitutional:  Positive for chills and fatigue. Negative for activity change and appetite change. HENT:  Negative for congestion, ear discharge, ear pain, nosebleeds, rhinorrhea and sore throat.     Eyes: Negative for discharge. Respiratory:  Negative for cough, shortness of breath, wheezing and stridor. Cardiovascular:  Negative for chest pain, palpitations and leg swelling. Gastrointestinal:  Negative for abdominal distention, abdominal pain and constipation. Genitourinary:  Negative for difficulty urinating and dysuria. Musculoskeletal:  Negative for arthralgias. Skin:  Negative for color change, pallor and rash. Neurological:  Positive for weakness. Negative for dizziness, tremors, syncope, speech difficulty, numbness and headaches. Psychiatric/Behavioral:  Negative for agitation and confusion. Except as noted above the remainder of the review of systems was reviewed and negative. PAST MEDICAL HISTORY     Past Medical History:   Diagnosis Date    ACE-inhibitor cough     Colon polyp     Depression     Diverticulosis     Family history of ischemic heart disease     High cholesterol     Hypertension     Palpitations     Vaginal dryness          SURGICALHISTORY       Past Surgical History:   Procedure Laterality Date    BACK SURGERY      disk removal    CARDIAC CATHETERIZATION      Had infusion of wrong fluids with ablation. ..flash pulmonary edema. Shanelle Zapata on vent x 4 hours in ICU. ..cardiac cath to follow negative    ENDOMETRIAL ABLATION      TONSILLECTOMY      WISDOM TOOTH EXTRACTION           CURRENT MEDICATIONS       Previous Medications    BENZONATATE (TESSALON PERLES) 100 MG CAPSULE    Take 2 capsules by mouth 3 times daily as needed for Cough    CALCIUM CARBONATE (OSCAL) 500 MG TABS TABLET    Take 500 mg by mouth daily    MULTIPLE VITAMINS-MINERALS (MULTI COMPLETE PO)    Take 1 tablet by mouth daily.       VITAMIN D (CHOLECALCIFEROL) 1000 UNIT TABS TABLET    Take 1,000 Units by mouth daily       ALLERGIES     Ace inhibitors    FAMILY HISTORY       Family History   Problem Relation Age of Onset    High Blood Pressure Mother     Cancer Father         skin, pancratic, espogas    Heart Disease Father     Cancer Maternal Grandmother         breast    High Blood Pressure Maternal Grandmother     High Blood Pressure Brother     Diabetes Other           SOCIAL HISTORY       Social History     Socioeconomic History    Marital status:      Spouse name: None    Number of children: None    Years of education: None    Highest education level: None   Occupational History    Occupation: accounts payable   Tobacco Use    Smoking status: Never    Smokeless tobacco: Never   Substance and Sexual Activity    Alcohol use: Yes     Alcohol/week: 0.0 standard drinks     Comment: social    Drug use: No    Sexual activity: Not Currently     Partners: Male     Social Determinants of Health     Financial Resource Strain: Low Risk     Difficulty of Paying Living Expenses: Not hard at all   Food Insecurity: No Food Insecurity    Worried About [employer] [employer] in the Last Year: Never true    [employer] in the Last Year: Never true   Transportation Needs: No Transportation Needs    Lack of Transportation (Medical): No    Lack of Transportation (Non-Medical): No       SCREENINGS    Jaspal Coma Scale  Eye Opening: Spontaneous  Best Verbal Response: Oriented  Best Motor Response: Obeys commands  [place] Coma Scale Score: 15 @FLOW(81085437)@      PHYSICAL EXAM    (up to 7 for level 4, 8 or more for level 5)     ED Triage Vitals [11/23/22 1459]   BP Temp Temp Source Heart Rate Resp SpO2 Height Weight   (!) 153/62 97.6 °F (36.4 °C) Oral (!) 108 -- -- 5' 6\" (1.676 m) --       Physical Exam  Vitals and nursing note reviewed. Constitutional:       General: She is not in acute distress. Appearance: Normal appearance. She is well-developed. She is not ill-appearing, toxic-appearing or diaphoretic. Comments: Nontoxic appearance, appears in no acute distress   HENT:      Head: Normocephalic. Right Ear: Tympanic membrane normal.      Left Ear: Tympanic membrane normal.      Nose: Nose normal. No congestion. Mouth/Throat:      Mouth: Mucous membranes are moist.      Pharynx: No oropharyngeal exudate or posterior oropharyngeal erythema. Eyes:      Extraocular Movements: Extraocular movements intact. Conjunctiva/sclera: Conjunctivae normal.      Pupils: Pupils are equal, round, and reactive to light. Neck:      Vascular: No JVD. Trachea: No tracheal deviation. Cardiovascular:      Rate and Rhythm: Normal rate. Pulses: Normal pulses. Heart sounds: Normal heart sounds. No murmur heard. No friction rub. No gallop. Pulmonary:      Effort: Pulmonary effort is normal. No tachypnea, accessory muscle usage, respiratory distress or retractions. Breath sounds: No stridor. No wheezing, rhonchi or rales. Comments: Lung sounds are clear in all fields, there is no wheezes rales or rhonchi, no accessory muscle use, no retractions, patient speaks in full sentences without acute distress or difficulty  Chest:      Chest wall: No tenderness. Abdominal:      General: Abdomen is flat. Bowel sounds are normal. There is no distension or abdominal bruit. Palpations: There is no shifting dullness, fluid wave, hepatomegaly, splenomegaly, mass or pulsatile mass. Tenderness: There is no abdominal tenderness. There is no right CVA tenderness, left CVA tenderness, guarding or rebound. Negative signs include Willis's sign, Rovsing's sign and McBurney's sign. Comments: Abdomen soft nondistended nontender no guarding mass rebound, no CVA tenderness. Musculoskeletal:         General: No deformity. Cervical back: Normal range of motion and neck supple. No rigidity. Right lower leg: No edema. Left lower leg: No edema. Comments: Patient has hallux valgus, of bilateral feet. No erythema, no open wounds, no ulcerations or blistering, dorsalis pedis pulses are present. Skin is pink warm and dry, capillary refill within 3 seconds. Skin:     General: Skin is warm and dry. Capillary Refill: Capillary refill takes less than 2 seconds. Coloration: Skin is not jaundiced. Neurological:      General: No focal deficit present. Mental Status: She is alert and oriented to person, place, and time. Mental status is at baseline. Cranial Nerves: No cranial nerve deficit. Sensory: No sensory deficit. Motor: No weakness. Coordination: Coordination normal.      Comments: Patient is neurologically intact, no facial droop, no slurring speech, no drift upper or lower extremities. Psychiatric:         Mood and Affect: Mood normal.       DIAGNOSTIC RESULTS     EKG: All EKG's are interpreted by the Emergency Department Physician who either signs or Co-signsthis chart in the absence of a cardiologist.        RADIOLOGY:   Ladonna Salmons such as CT, Ultrasound and MRI are read by the radiologist. Plain radiographic images are visualized and preliminarily interpreted by the emergency physician with the below findings:        Interpretation per the Radiologist below, if available at the time ofthis note:    XR CHEST PORTABLE   Final Result   No acute process. Mildly elevated right hemidiaphragm.                ED BEDSIDE ULTRASOUND:   Performed by ED Physician - none    LABS:  Labs Reviewed   CBC WITH AUTO DIFFERENTIAL - Abnormal; Notable for the following components:       Result Value    WBC 15.9 (*)     RBC 3.85 (*)     Hematocrit 36.8 (*)     MCV 95.8 (*)     MCHC 32.5 (*)     Neutrophils Absolute 14.6 (*)     Lymphocytes Absolute 0.4 (*)     Monocytes Absolute 0.9 (*)     All other components within normal limits   COMPREHENSIVE METABOLIC PANEL - Abnormal; Notable for the following components:    CO2 19 (*)     Anion Gap 18 (*)     Glucose 113 (*)     BUN 31 (*)     Creatinine 1.61 (*)     Est, Glom Filt Rate 35.8 (*)     Albumin 3.2 (*)     Globulin 4.0 (*)     All other components within normal limits   URINALYSIS - Abnormal; Notable for the following components:    Clarity, UA CLOUDY (*)     Ketones, Urine 40 (*)     Blood, Urine MODERATE (*)     Protein, UA 30 (*)     Leukocyte Esterase, Urine MODERATE (*)     All other components within normal limits   MICROSCOPIC URINALYSIS - Abnormal; Notable for the following components:    Bacteria, UA FEW (*)     WBC, UA 6-9 (*)     All other components within normal limits   COVID-19, RAPID   RAPID INFLUENZA A/B ANTIGENS   RESPIRATORY PANEL, MOLECULAR, WITH COVID-19   CULTURE, URINE   LACTIC ACID   TROPONIN   CK       All other labs were within normal range or not returned as of this dictation. EMERGENCY DEPARTMENT COURSE and DIFFERENTIAL DIAGNOSIS/MDM:   Vitals:    Vitals:    11/23/22 1730 11/23/22 1745 11/23/22 1800 11/23/22 1815   BP: (!) 148/61  (!) 141/51    Pulse:   93    Resp:   18    Temp:       TempSrc:       SpO2: 99% 100% 100% 99%   Height:            MDM     Amount and/or Complexity of Data Reviewed  Decide to obtain previous medical records or to obtain history from someone other than the patient: yes      Chest x-ray shows no acute process. BUN 31, creatinine 1.6 (baseline), WBC 15.9. Urine shows moderate leukocytes. Patient given 2 L IV fluid and eventually able to provide urine sample, dehydration present. Started on gradual IV fluids. Wearing oxygen for comfort but 98% on room air. Patient still has moderate weakness and doesn't feel she is able to ambulate. Moderate TTP with light touch of bilateral feet without signs of trauma or infection. Sensory intact distally. Pending respiratory panel. ? UTI but holding on antibiotics until culture. CRITICAL CARE TIME   Total Critical Care time was 0 minutes, excluding separately reportableprocedures. There was a high probability of clinicallysignificant/life threatening deterioration in the patient's condition which required my urgent intervention.       CONSULTS:  None    PROCEDURES:  Unless otherwise noted below, none     Procedures    FINAL IMPRESSION      1. Dehydration    2. Generalized weakness    3. Paresthesia of foot, bilateral          DISPOSITION/PLAN   DISPOSITION Decision To Admit 11/23/2022 06:26:02 PM      PATIENT REFERRED TO:  No follow-up provider specified. DISCHARGE MEDICATIONS:  New Prescriptions    No medications on file          (Please note that portions of this note were completed with a voice recognition program.  Efforts were made to edit the dictations but occasionally words are mis-transcribed. )    JAMESON Mehta (electronically signed)  Attending Emergency Physician         JAMESON Song  11/23/22 [address]  11/23/22 0432

## 2022-11-23 NOTE — ED TRIAGE NOTES
[facility] with c/o general illness and bilateral foot pain for 3 days   Pt hasn't been out of bed for 3 days   Pt states she has had chills, fever, and diarrhea   Pt states he pain is a 9/10  Pt is A&Ox 4  Pt lives at home   Pt's breathing and respirations are unlabored and equal   Pt states before these last 3 days she was getting around just fine)  Pt denies using assistance devices

## 2022-11-24 LAB
ANION GAP SERPL CALCULATED.3IONS-SCNC: 14 MEQ/L (ref 9–15)
BASOPHILS ABSOLUTE: 0 K/UL (ref 0–0.2)
BASOPHILS RELATIVE PERCENT: 0.1 %
BUN BLDV-MCNC: 22 MG/DL (ref 8–23)
CALCIUM SERPL-MCNC: 8.2 MG/DL (ref 8.5–9.9)
CHLORIDE BLD-SCNC: 105 MEQ/L (ref 95–107)
CO2: 18 MEQ/L (ref 20–31)
CREAT SERPL-MCNC: 1.43 MG/DL (ref 0.5–0.9)
EOSINOPHILS ABSOLUTE: 0 K/UL (ref 0–0.7)
EOSINOPHILS RELATIVE PERCENT: 0 %
FERRITIN: 281 NG/ML (ref 13–150)
FOLATE: 12.8 NG/ML
GFR SERPL CREATININE-BSD FRML MDRD: 41.3 ML/MIN/{1.73_M2}
GLUCOSE BLD-MCNC: 78 MG/DL (ref 70–99)
HBA1C MFR BLD: 5 % (ref 4.8–5.9)
HCT VFR BLD CALC: 30.8 % (ref 37–47)
HEMOGLOBIN: 10.3 G/DL (ref 12–16)
IRON SATURATION: 6 % (ref 20–55)
IRON: 13 UG/DL (ref 37–145)
LYMPHOCYTES ABSOLUTE: 0.9 K/UL (ref 1–4.8)
LYMPHOCYTES RELATIVE PERCENT: 7.1 %
MCH RBC QN AUTO: 31.8 PG (ref 27–31.3)
MCHC RBC AUTO-ENTMCNC: 33.5 % (ref 33–37)
MCV RBC AUTO: 94.9 FL (ref 79.4–94.8)
MONOCYTES ABSOLUTE: 1 K/UL (ref 0.2–0.8)
MONOCYTES RELATIVE PERCENT: 7.8 %
NEUTROPHILS ABSOLUTE: 11 K/UL (ref 1.4–6.5)
NEUTROPHILS RELATIVE PERCENT: 85 %
PDW BLD-RTO: 13.1 % (ref 11.5–14.5)
PLATELET # BLD: 266 K/UL (ref 130–400)
POTASSIUM SERPL-SCNC: 3.9 MEQ/L (ref 3.4–4.9)
RBC # BLD: 3.25 M/UL (ref 4.2–5.4)
SODIUM BLD-SCNC: 137 MEQ/L (ref 135–144)
TOTAL IRON BINDING CAPACITY: 202 UG/DL (ref 250–450)
UNSATURATED IRON BINDING CAPACITY: 189 UG/DL (ref 112–347)
VITAMIN B-12: >2000 PG/ML (ref 232–1245)
WBC # BLD: 13 K/UL (ref 4.8–10.8)

## 2022-11-24 PROCEDURE — 36415 COLL VENOUS BLD VENIPUNCTURE: CPT

## 2022-11-24 PROCEDURE — 96361 HYDRATE IV INFUSION ADD-ON: CPT

## 2022-11-24 PROCEDURE — 6370000000 HC RX 637 (ALT 250 FOR IP): Performed by: INTERNAL MEDICINE

## 2022-11-24 PROCEDURE — 80048 BASIC METABOLIC PNL TOTAL CA: CPT

## 2022-11-24 PROCEDURE — G0378 HOSPITAL OBSERVATION PER HR: HCPCS

## 2022-11-24 PROCEDURE — 85025 COMPLETE CBC W/AUTO DIFF WBC: CPT

## 2022-11-24 PROCEDURE — 2580000003 HC RX 258: Performed by: INTERNAL MEDICINE

## 2022-11-24 RX ORDER — OXYCODONE HYDROCHLORIDE 5 MG/1
5 TABLET ORAL EVERY 6 HOURS PRN
Status: DISCONTINUED | OUTPATIENT
Start: 2022-11-24 | End: 2022-12-01 | Stop reason: HOSPADM

## 2022-11-24 RX ORDER — ALLOPURINOL 100 MG/1
50 TABLET ORAL DAILY
Status: DISCONTINUED | OUTPATIENT
Start: 2022-11-24 | End: 2022-12-01 | Stop reason: HOSPADM

## 2022-11-24 RX ORDER — COLCHICINE 0.6 MG/1
0.6 TABLET ORAL DAILY
Status: DISCONTINUED | OUTPATIENT
Start: 2022-11-24 | End: 2022-11-24

## 2022-11-24 RX ADMIN — PREDNISONE 40 MG: 20 TABLET ORAL at 08:35

## 2022-11-24 RX ADMIN — SODIUM CHLORIDE, POTASSIUM CHLORIDE, SODIUM LACTATE AND CALCIUM CHLORIDE 1000 ML: 600; 310; 30; 20 INJECTION, SOLUTION INTRAVENOUS at 23:09

## 2022-11-24 RX ADMIN — SODIUM CHLORIDE, POTASSIUM CHLORIDE, SODIUM LACTATE AND CALCIUM CHLORIDE: 600; 310; 30; 20 INJECTION, SOLUTION INTRAVENOUS at 14:40

## 2022-11-24 RX ADMIN — ALLOPURINOL 50 MG: 100 TABLET ORAL at 11:48

## 2022-11-24 ASSESSMENT — PAIN SCALES - GENERAL
PAINLEVEL_OUTOF10: 0
PAINLEVEL_OUTOF10: 0

## 2022-11-24 NOTE — PROGRESS NOTES
Hospitalist Progress Note      PCP: Rachel Oscar MD    Date of Admission: 11/23/2022    Chief Complaint:      Subjective: :  Patient reporting 7 out of 10 pain, greatest at visible tophi at the great toes bilaterally, also ankle edema and swelling. Patient is afebrile, leukocytosis 13,000. Monitor for fever        Medications:  Reviewed    Infusion Medications    lactated ringers 125 mL/hr at 11/23/22 2207     Scheduled Medications    allopurinol  50 mg Oral Daily    predniSONE  40 mg Oral Daily     PRN Meds: oxyCODONE    No intake or output data in the 24 hours ending 11/24/22 0852    Exam:    BP (!) 148/62   Pulse 92   Temp 98.6 °F (37 °C)   Resp 18   Ht 5' 5.98\" (1.676 m)   Wt 220 lb (99.8 kg)   SpO2 98%   BMI 35.53 kg/m²     Gen: Alert and oriented x3, no acute distress  HEENT: Normocephalic, atraumatic, pupils are equal reactive to light, trachea is midline  RESP: Clear to auscultation bilaterally  Cardio: Normal S1-S2  Abdomen: Soft, nondistended nontender to palpation bowel sounds in 4  Ext: Bilateral tophi great toes, exquisitely tender to palpation. Bilateral ankle edema and swelling, pain with palpation and restricted range of motion bilaterally due to pain  Skin: Warm and Dry        Labs:   Recent Labs     11/23/22  1515 11/24/22  0451   WBC 15.9* 13.0*   HGB 12.0 10.3*   HCT 36.8* 30.8*    266     Recent Labs     11/23/22  1515 11/24/22  0451    137   K 4.7 3.9    105   CO2 19* 18*   BUN 31* 22   CREATININE 1.61* 1.43*   CALCIUM 9.0 8.2*     Recent Labs     11/23/22  1515 11/23/22  2149   AST 23 14   ALT 13 11   BILIDIR  --  <0.2   BILITOT 0.6 0.5   ALKPHOS 100 86     No results for input(s): INR in the last 72 hours.   Recent Labs     11/23/22  1515   CKTOTAL 123   TROPONINI <0.010       Urinalysis:      Lab Results   Component Value Date/Time    NITRU Negative 11/23/2022 03:15 PM    [address] 11/23/2022 03:15 PM    BACTERIA FEW 11/23/2022 03:15 PM    RBCUA 0-2 11/23/2022 03:15 PM    BLOODU MODERATE 11/23/2022 03:15 PM    SPECGRAV 1.022 11/23/2022 03:15 PM    GLUCOSEU Negative 11/23/2022 03:15 PM       Radiology:  XR CHEST PORTABLE   Final Result   No acute process. Mildly elevated right hemidiaphragm. Assessment/Plan:    Active Hospital Problems    Diagnosis Date Noted    Weakness [R53.1] 11/23/2022     Priority: Medium     B/L LE pain and swelling, Crystallopathy suspected  -Presenting with exquisite pain to the lower extremities bilaterally and inability to ambulate, inflammatory markers are elevated, also with leukocytosis  -Gout suspected, could also be pseudogout, uric acid is 3.6  -Orthopedics consulted for joint aspirate, case discussed, patient be evaluated tomorrow  -Continue 40 mg prednisone daily, start renally dosed allopurinol, will hold colchicine until renal function is improved  -DWIGHT and rheumatoid panel are pending      SHLOMO  -Presenting serum creatinine of 1.6, suspect decreased intake at home as patient was reportedly bedbound for several days.  -Continue IV fluid hydration and follow serial BMP    Leukocytosis: Suspect reactive to gout, monitor, if patient has fever obtain blood cultures x2 and panculture          Additional work up or/and treatment plan may be added today or then after based on clinical progression. I am managing a portion of pt care. Some medical issues are handled by other specialists. Additional work up and treatment should be done in out pt setting by pt PCP and other out pt providers. Diet: ADULT DIET;  Regular    PT/OT Eval     Code Status: No Order    Jarvis Caruso DO                Electronically signed by Jarvis Caruso DO on 11/24/2022 at 8:52 AM

## 2022-11-24 NOTE — PROGRESS NOTES
Patient arrived on unit at 2040 via stretcher from emergency room. Patient alert and oriented, responding appropriately to verbal commands. Assessment completed, BLE weakness noted and patient reports some numbness and unable to ambulate for the last four days. Patient has Richards Chappell in place. Patient orientated to the room, call light and encouraged to call with any concerns. Patient  has home medications at bedside along with cell phone and . All safety measures in place and call light within reach, will continue to monitor.

## 2022-11-25 ENCOUNTER — APPOINTMENT (OUTPATIENT)
Dept: MRI IMAGING | Age: 62
DRG: 552 | End: 2022-11-25
Payer: COMMERCIAL

## 2022-11-25 ENCOUNTER — APPOINTMENT (OUTPATIENT)
Dept: GENERAL RADIOLOGY | Age: 62
DRG: 552 | End: 2022-11-25
Payer: COMMERCIAL

## 2022-11-25 ENCOUNTER — APPOINTMENT (OUTPATIENT)
Dept: ULTRASOUND IMAGING | Age: 62
DRG: 552 | End: 2022-11-25
Payer: COMMERCIAL

## 2022-11-25 LAB
ANION GAP SERPL CALCULATED.3IONS-SCNC: 11 MEQ/L (ref 9–15)
BASOPHILS ABSOLUTE: 0 K/UL (ref 0–0.2)
BASOPHILS RELATIVE PERCENT: 0.1 %
BUN BLDV-MCNC: 22 MG/DL (ref 8–23)
CALCIUM SERPL-MCNC: 9 MG/DL (ref 8.5–9.9)
CHLORIDE BLD-SCNC: 108 MEQ/L (ref 95–107)
CO2: 23 MEQ/L (ref 20–31)
CREAT SERPL-MCNC: 1.26 MG/DL (ref 0.5–0.9)
EOSINOPHILS ABSOLUTE: 0 K/UL (ref 0–0.7)
EOSINOPHILS RELATIVE PERCENT: 0.1 %
GFR SERPL CREATININE-BSD FRML MDRD: 48.1 ML/MIN/{1.73_M2}
GLUCOSE BLD-MCNC: 88 MG/DL (ref 70–99)
HCT VFR BLD CALC: 32.8 % (ref 37–47)
HEMOGLOBIN: 10.7 G/DL (ref 12–16)
LYMPHOCYTES ABSOLUTE: 1.1 K/UL (ref 1–4.8)
LYMPHOCYTES RELATIVE PERCENT: 8.5 %
MCH RBC QN AUTO: 31.2 PG (ref 27–31.3)
MCHC RBC AUTO-ENTMCNC: 32.6 % (ref 33–37)
MCV RBC AUTO: 95.7 FL (ref 79.4–94.8)
MONOCYTES ABSOLUTE: 0.9 K/UL (ref 0.2–0.8)
MONOCYTES RELATIVE PERCENT: 6.8 %
NEUTROPHILS ABSOLUTE: 10.5 K/UL (ref 1.4–6.5)
NEUTROPHILS RELATIVE PERCENT: 84.5 %
ORGANISM: ABNORMAL
PDW BLD-RTO: 13.4 % (ref 11.5–14.5)
PLATELET # BLD: 290 K/UL (ref 130–400)
POTASSIUM SERPL-SCNC: 4.7 MEQ/L (ref 3.4–4.9)
RBC # BLD: 3.42 M/UL (ref 4.2–5.4)
SODIUM BLD-SCNC: 142 MEQ/L (ref 135–144)
URINE CULTURE, ROUTINE: ABNORMAL
URINE CULTURE, ROUTINE: ABNORMAL
WBC # BLD: 12.4 K/UL (ref 4.8–10.8)

## 2022-11-25 PROCEDURE — 73630 X-RAY EXAM OF FOOT: CPT

## 2022-11-25 PROCEDURE — 2580000003 HC RX 258: Performed by: INTERNAL MEDICINE

## 2022-11-25 PROCEDURE — 99222 1ST HOSP IP/OBS MODERATE 55: CPT | Performed by: PSYCHIATRY & NEUROLOGY

## 2022-11-25 PROCEDURE — 83516 IMMUNOASSAY NONANTIBODY: CPT

## 2022-11-25 PROCEDURE — 73610 X-RAY EXAM OF ANKLE: CPT

## 2022-11-25 PROCEDURE — 1210000000 HC MED SURG R&B

## 2022-11-25 PROCEDURE — 85025 COMPLETE CBC W/AUTO DIFF WBC: CPT

## 2022-11-25 PROCEDURE — 6370000000 HC RX 637 (ALT 250 FOR IP): Performed by: INTERNAL MEDICINE

## 2022-11-25 PROCEDURE — 6360000002 HC RX W HCPCS: Performed by: INTERNAL MEDICINE

## 2022-11-25 PROCEDURE — 80048 BASIC METABOLIC PNL TOTAL CA: CPT

## 2022-11-25 PROCEDURE — 86235 NUCLEAR ANTIGEN ANTIBODY: CPT

## 2022-11-25 PROCEDURE — 36415 COLL VENOUS BLD VENIPUNCTURE: CPT

## 2022-11-25 PROCEDURE — 72148 MRI LUMBAR SPINE W/O DYE: CPT

## 2022-11-25 PROCEDURE — 96361 HYDRATE IV INFUSION ADD-ON: CPT

## 2022-11-25 PROCEDURE — 93970 EXTREMITY STUDY: CPT

## 2022-11-25 RX ORDER — ATORVASTATIN CALCIUM 20 MG/1
20 TABLET, FILM COATED ORAL NIGHTLY
Status: DISCONTINUED | OUTPATIENT
Start: 2022-11-25 | End: 2022-12-01 | Stop reason: HOSPADM

## 2022-11-25 RX ORDER — BENZONATATE 100 MG/1
200 CAPSULE ORAL 3 TIMES DAILY PRN
Status: DISCONTINUED | OUTPATIENT
Start: 2022-11-25 | End: 2022-12-01 | Stop reason: HOSPADM

## 2022-11-25 RX ORDER — CALCIUM CARBONATE 500(1250)
500 TABLET ORAL DAILY
Status: DISCONTINUED | OUTPATIENT
Start: 2022-11-25 | End: 2022-12-01 | Stop reason: HOSPADM

## 2022-11-25 RX ORDER — HYDRALAZINE HYDROCHLORIDE 25 MG/1
25 TABLET, FILM COATED ORAL 2 TIMES DAILY
Status: DISCONTINUED | OUTPATIENT
Start: 2022-11-25 | End: 2022-12-01 | Stop reason: HOSPADM

## 2022-11-25 RX ORDER — VITAMIN B COMPLEX
1000 TABLET ORAL DAILY
Status: DISCONTINUED | OUTPATIENT
Start: 2022-11-25 | End: 2022-12-01 | Stop reason: HOSPADM

## 2022-11-25 RX ADMIN — HYDRALAZINE HYDROCHLORIDE 25 MG: 25 TABLET, FILM COATED ORAL at 11:05

## 2022-11-25 RX ADMIN — ALLOPURINOL 50 MG: 100 TABLET ORAL at 08:00

## 2022-11-25 RX ADMIN — CEFTRIAXONE SODIUM 1000 MG: 1 INJECTION, POWDER, FOR SOLUTION INTRAMUSCULAR; INTRAVENOUS at 11:07

## 2022-11-25 RX ADMIN — OXYCODONE 5 MG: 5 TABLET ORAL at 08:00

## 2022-11-25 RX ADMIN — ATORVASTATIN CALCIUM 20 MG: 20 TABLET, FILM COATED ORAL at 20:38

## 2022-11-25 RX ADMIN — Medication 1000 UNITS: at 11:05

## 2022-11-25 RX ADMIN — HYDRALAZINE HYDROCHLORIDE 25 MG: 25 TABLET, FILM COATED ORAL at 20:38

## 2022-11-25 RX ADMIN — SODIUM CHLORIDE, POTASSIUM CHLORIDE, SODIUM LACTATE AND CALCIUM CHLORIDE 1000 ML: 600; 310; 30; 20 INJECTION, SOLUTION INTRAVENOUS at 21:11

## 2022-11-25 RX ADMIN — Medication 500 MG: at 11:05

## 2022-11-25 RX ADMIN — METOPROLOL TARTRATE 25 MG: 25 TABLET, FILM COATED ORAL at 11:05

## 2022-11-25 RX ADMIN — PREDNISONE 40 MG: 20 TABLET ORAL at 08:00

## 2022-11-25 RX ADMIN — METOPROLOL TARTRATE 25 MG: 25 TABLET, FILM COATED ORAL at 20:38

## 2022-11-25 ASSESSMENT — ENCOUNTER SYMPTOMS
SHORTNESS OF BREATH: 0
COLOR CHANGE: 0
PHOTOPHOBIA: 0
CHOKING: 0
BACK PAIN: 0
VOMITING: 0
TROUBLE SWALLOWING: 0
NAUSEA: 0

## 2022-11-25 ASSESSMENT — PAIN SCALES - GENERAL: PAINLEVEL_OUTOF10: 0

## 2022-11-25 NOTE — PROGRESS NOTES
Hospitalist Progress Note      PCP: Debo Gonzalez MD    Date of Admission: 11/23/2022    Chief Complaint:      Subjective: :  No new symptoms. No fever. Feels weakness is improving. Medications:  Reviewed    Infusion Medications    lactated ringers 1,000 mL (11/24/22 2309)     Scheduled Medications    cefTRIAXone (ROCEPHIN) IV  1,000 mg IntraVENous Q24H    atorvastatin  20 mg Oral Nightly    calcium elemental  500 mg Oral Daily    hydrALAZINE  25 mg Oral BID    metoprolol tartrate  25 mg Oral BID    Vitamin D  1,000 Units Oral Daily    allopurinol  50 mg Oral Daily    predniSONE  40 mg Oral Daily     PRN Meds: benzonatate, oxyCODONE      Intake/Output Summary (Last 24 hours) at 11/25/2022 1319  Last data filed at 11/25/2022 0930  Gross per 24 hour   Intake --   Output 1750 ml   Net -1750 ml       Exam:    BP (!) 148/62   Pulse (!) 101   Temp 98.6 °F (37 °C)   Resp 18   Ht 5' 5.98\" (1.676 m)   Wt 220 lb (99.8 kg)   SpO2 98%   BMI 35.53 kg/m²     Gen: Alert and oriented x3, no acute distress  HEENT: Normocephalic, atraumatic, pupils are equal reactive to light, trachea is midline  RESP: Clear to auscultation bilaterally  Cardio: Normal S1-S2  Abdomen: Soft, nondistended nontender to palpation bowel sounds in 4  Ext: Bilateral tophi great toes, tender to palpation.   Bilateral ankle edema and swelling, pain with palpation and restricted range of motion bilaterally due to pain  Skin: Warm and Dry        Labs:   Recent Labs     11/23/22  1515 11/24/22  0451 11/25/22  0516   WBC 15.9* 13.0* 12.4*   HGB 12.0 10.3* 10.7*   HCT 36.8* 30.8* 32.8*    266 290     Recent Labs     11/23/22  1515 11/24/22  0451 11/25/22  0516    137 142   K 4.7 3.9 4.7    105 108*   CO2 19* 18* 23   BUN 31* 22 22   CREATININE 1.61* 1.43* 1.26*   CALCIUM 9.0 8.2* 9.0     Recent Labs     11/23/22  1515 11/23/22  2149   AST 23 14   ALT 13 11   BILIDIR  --  <0.2   BILITOT 0.6 0.5   ALKPHOS 100 86     No results for input(s): INR in the last 72 hours. Recent Labs     11/23/22  1515   CKTOTAL 123   TROPONINI <0.010       Urinalysis:      Lab Results   Component Value Date/Time    NITRU Negative 11/23/2022 03:15 PM    [address] 11/23/2022 03:15 PM    BACTERIA FEW 11/23/2022 03:15 PM    RBCUA 0-2 11/23/2022 03:15 PM    BLOODU MODERATE 11/23/2022 03:15 PM    SPECGRAV 1.022 11/23/2022 03:15 PM    GLUCOSEU Negative 11/23/2022 03:15 PM       Radiology:  XR ANKLE LEFT (MIN 3 VIEWS)   Final Result   No fracture or dislocation. Minimal plantar heel spur. XR FOOT RIGHT (MIN 3 VIEWS)   Final Result   Marked hallux valgus deformity. XR ANKLE RIGHT (MIN 3 VIEWS)   Final Result   Negative study of the right ankle. XR FOOT LEFT (MIN 3 VIEWS)   Final Result   Hilus valgus deformity no fracture or dislocation         XR CHEST PORTABLE   Final Result   No acute process. Mildly elevated right hemidiaphragm.          MRI LUMBAR SPINE WO CONTRAST    (Results Pending)   US DUP LOWER EXTREMITIES BILATERAL VENOUS    (Results Pending)           Assessment/Plan:    Active Hospital Problems    Diagnosis Date Noted    Weakness [R53.1] 11/23/2022     Priority: Medium     B/L LE pain and swelling, Crystallopathy suspected  -Presenting with exquisite pain to the lower extremities bilaterally and inability to ambulate, inflammatory markers are elevated, also with leukocytosis  -Gout suspected, could also be pseudogout, uric acid is 3.6  -Orthopedics consulted for joint aspirate, case discussed, patient be evaluated tomorrow  -Continue 40 mg prednisone daily, start renally dosed allopurinol, will hold colchicine until renal function is improved  -DWIGHT and rheumatoid panel are pending  -MRI lumbar ordered by neurology    UTI  - start rocephin   - follow up urine culture       SHLOMO  -Cr 1.6-->1.2, suspect decreased intake at home as patient was reportedly bedbound for several days.  -Continue IV fluid hydration and follow serial BMP    Leukocytosis: Suspect reactive to gout, monitor, if patient has fever obtain blood cultures x2 and panculture          Additional work up or/and treatment plan may be added today or then after based on clinical progression. I am managing a portion of pt care. Some medical issues are handled by other specialists. Additional work up and treatment should be done in out pt setting by pt PCP and other out pt providers. Diet: ADULT DIET;  Regular    PT/OT Eval         Electronically signed by Azam Cleary DO on 11/25/2022 at 1:19 PM

## 2022-11-25 NOTE — FLOWSHEET NOTE
Pt. Has been calm & cooperative. Pt. Had one c/o pain before receiving predisone. Pt. Has not c/o pain since. Pt. Has been resting in bed.

## 2022-11-25 NOTE — CARE COORDINATION
[facility] [place] Case Management Initial Discharge Assessment    Met with Patient to discuss discharge plan. PCP: Diana Walker MD                                Date of Last Visit: N/A    [facility] Patient: No        [facility] Notified: no    If no PCP, list provided? N/A    Discharge Planning    Living Arrangements: independently at home    Who do you live with? ROOMMATE     Who helps you with your care:  self    If lives at home:     Do you have any barriers navigating in your home? no    Patient can perform ADL? Yes    Current Services (outpatient and in home) :  None    Dialysis: No    Is transportation available to get to your appointments? Yes    DME Equipment:  yes - [facility]     Respiratory equipment: None    Respiratory provider:  no     Pharmacy:  yes - [facility]  with Medication Assistance Program?  No      Patient agreeable to [facility]? Yes, Company TBD    Patient agreeable to SNF/Rehab? Yes, Company TBD    Other discharge needs identified? N/A    Does Patient Have a High-Risk for Readmission Diagnosis (CHF, PN, MI, COPD)? No        Initial Discharge Plan? (Note: please see concurrent daily documentation for any updates after initial note). LSW MEET WITH PT AT BEDSIDE. PT AGREE WITH DC PLAN HOME WITH ROOMMATE. Readmission Risk              Risk of Unplanned Readmission:  11        CMI DONE.    Electronically signed by KARTHIK Thomas on 11/25/2022 at 10:48 AM

## 2022-11-25 NOTE — PROGRESS NOTES
Physician Progress Note      PATIENT:               Abad Wharton  CSN #:                  [id number]  :                       1960  ADMIT DATE:       2022 2:53 PM  [address] [place] DATE:  RESPONDING  PROVIDER #:        Judith Chanel DO          QUERY TEXT:    Patient admitted with Bilateral lower extremity edema with debilitating pain,    CKD unclear cause. Per Dr Sandra Noguera progress note 22  \"SHLOMO presenting serum   creatinine of 1.6\". If possible, please document in progress notes and   discharge summary the present on admission status of SHLOMO:    The medical record reflects the following:  Risk Factors: 58 yof  suspected gout  HTN   decreased intake at home   reportedly bedbound for several days  Clinical Indicators: BUN creatinine GFR .61/35.8  .4341.3     ./48.1  Treatment: IVF  pertinent labs    Thank you  Yari OLIVAN RN CDS   M-F 6am-2pm  Options provided:  -- Yes, SHLOMO was present at the time of the order to admit to the hospital,   Please document evidence as well as a numerical baseline creatinine, if known. -- No, SHLOMO was not present on admission and developed during the inpatient   stay, Please document evidence as well as a numerical baseline creatinine, if   known. -- SHLOMO ruled out CKD only, Please document evidence as well as a numerical   baseline creatinine and CKD stage, if known.   -- Other - I will add my own diagnosis  -- Disagree - Not applicable / Not valid  -- Disagree - Clinically unable to determine / Unknown  -- Refer to Clinical Documentation Reviewer    PROVIDER RESPONSE TEXT:    Yes, SHLOMO was present at the time of the order to admit to the hospital Cr 1.6    Query created by: Kasi Baumann on 2022 11:49 AM      Electronically signed by:  Lenora Ha DO 2022 2:14 PM

## 2022-11-25 NOTE — CONSULTS
Subjective:      Patient ID: Denette Cogan is a 58 y.o. female who presents today for weakness in the lower extremity. HPI 58 right-handed female admitted with significant pain in the ankles and foot with swelling bilaterally going on for a few days at least a week. Patient was not able to ambulate at home and has been in bed due to the significant pain she has. She reports no symptoms in her upper extremity. She has no bowel or bladder dysfunction. Patient reports numbness and swelling of her lower extremity and considerable pain that she cannot walk. Patient has no color changes in lower extremity and denies any back pain    Review of Systems   Constitutional:  Negative for fever. HENT:  Negative for ear pain, tinnitus and trouble swallowing. Eyes:  Negative for photophobia and visual disturbance. Respiratory:  Negative for choking and shortness of breath. Cardiovascular:  Negative for chest pain and palpitations. Gastrointestinal:  Negative for nausea and vomiting. Musculoskeletal:  Negative for back pain, gait problem, joint swelling, myalgias, neck pain and neck stiffness. Skin:  Negative for color change. Allergic/Immunologic: Negative for food allergies. Neurological:  Positive for weakness and numbness. Negative for dizziness, tremors, seizures, syncope, facial asymmetry, speech difficulty, light-headedness and headaches. Psychiatric/Behavioral:  Negative for behavioral problems, confusion, hallucinations and sleep disturbance. Past Medical History:   Diagnosis Date    ACE-inhibitor cough     Colon polyp     Depression     Diverticulosis     Family history of ischemic heart disease     High cholesterol     Hypertension     Palpitations     Vaginal dryness      Past Surgical History:   Procedure Laterality Date    BACK SURGERY      disk removal    CARDIAC CATHETERIZATION      Had infusion of wrong fluids with ablation. ..flash pulmonary edema. Izzy Rivera  on vent x 4 hours in ICU...cardiac cath to follow negative    ENDOMETRIAL ABLATION      TONSILLECTOMY      WISDOM TOOTH EXTRACTION       Social History     Socioeconomic History    Marital status:      Spouse name: Not on file    Number of children: Not on file    Years of education: Not on file    Highest education level: Not on file   Occupational History    Occupation: accounts payable   Tobacco Use    Smoking status: Never    Smokeless tobacco: Never   Substance and Sexual Activity    Alcohol use: Yes     Alcohol/week: 0.0 standard drinks     Comment: social    Drug use: No    Sexual activity: Not Currently     Partners: Male   Other Topics Concern    Not on file   Social History Narrative    Not on file     Social Determinants of Health     Financial Resource Strain: Low Risk     Difficulty of Paying Living Expenses: Not hard at all   Food Insecurity: No Food Insecurity    Worried About Running Out of Food in the Last Year: Never true    [address] in the Last Year: Never true   Transportation Needs: No Transportation Needs    Lack of Transportation (Medical): No    Lack of Transportation (Non-Medical):  No   Physical Activity: Not on file   Stress: Not on file   Social Connections: Not on file   Intimate Partner Violence: Not on file   Housing Stability: Not on file     Family History   Problem Relation Age of Onset    High Blood Pressure Mother     Cancer Father         skin, pancratic, espogas    Heart Disease Father     Cancer Maternal Grandmother         breast    High Blood Pressure Maternal Grandmother     High Blood Pressure Brother     Diabetes Other      Allergies   Allergen Reactions    Ace Inhibitors Other (See Comments)     Cough       Current Facility-Administered Medications   Medication Dose Route Frequency Provider Last Rate Last Admin    cefTRIAXone (ROCEPHIN) 1,000 mg in dextrose 5 % 50 mL IVPB mini-bag  1,000 mg IntraVENous Q24H Carmen Boston,  mL/hr at 11/25/22 1107 1,000 mg at 11/25/22 1107 atorvastatin (LIPITOR) tablet 20 mg  20 mg Oral Nightly Minal Grade, DO        benzonatate (TESSALON) capsule 200 mg  200 mg Oral TID PRN Minal Grade, DO        calcium elemental (OSCAL) tablet 500 mg  500 mg Oral Daily Minal Grade, DO   500 mg at 11/25/22 1105    hydrALAZINE (APRESOLINE) tablet 25 mg  25 mg Oral BID Minal Grade, DO   25 mg at 11/25/22 1105    metoprolol tartrate (LOPRESSOR) tablet 25 mg  25 mg Oral BID Minal Grade, DO   25 mg at 11/25/22 1105    vitamin D (CHOLECALCIFEROL) tablet 1,000 Units  1,000 Units Oral Daily Minal Grade, DO   1,000 Units at 11/25/22 1105    oxyCODONE (ROXICODONE) immediate release tablet 5 mg  5 mg Oral Q6H PRN Padma Slack, DO   5 mg at 11/25/22 0800    allopurinol (ZYLOPRIM) tablet 50 mg  50 mg Oral Daily Padma Slack, DO   50 mg at 11/25/22 0800    lactated ringers infusion   IntraVENous Continuous Skippy Mends Sedar,  mL/hr at 11/24/22 2309 1,000 mL at 11/24/22 2309    predniSONE (DELTASONE) tablet 40 mg  40 mg Oral Daily Sergei D Sedar, DO   40 mg at 11/25/22 0800        Objective:   BP (!) 148/62   Pulse (!) 101   Temp 98.6 °F (37 °C)   Resp 18   Ht 5' 5.98\" (1.676 m)   Wt 220 lb (99.8 kg)   SpO2 98%   BMI 35.53 kg/m²     Physical Exam  Vitals reviewed. Eyes:      Pupils: Pupils are equal, round, and reactive to light. Cardiovascular:      Rate and Rhythm: Normal rate and regular rhythm. Heart sounds: No murmur heard. Pulmonary:      Effort: Pulmonary effort is normal.      Breath sounds: Normal breath sounds. Abdominal:      General: Bowel sounds are normal.   Musculoskeletal:         General: Normal range of motion. Cervical back: Normal range of motion. Skin:     General: Skin is warm. Neurological:      Mental Status: She is alert and oriented to person, place, and time. Cranial Nerves: No cranial nerve deficit. Sensory: No sensory deficit. Motor: No abnormal muscle tone.       Coordination: Coordination normal.      Deep Tendon Reflexes: Reflexes are normal and symmetric. Babinski sign absent on the right side. Babinski sign absent on the left side. Comments: Patient is almost fixed ankles right more than left with some degree of dorsiflexion plantarflexion considerable swelling in the ankles and foot with pedal edema. Dorsalis pedis pulse was not palpable but no color changes are notable in the lower extremity. Reflexes are difficult to obtain at the ankles with 1+ reflex at the knees and had upper extremity strength and reflexes are normal.  Not definite sensory levels are noted. Patient is not ambulatory due to the pain   Psychiatric:         Mood and Affect: Mood normal.       XR CHEST PORTABLE    Result Date: 11/23/2022  EXAMINATION: ONE XRAY VIEW OF THE CHEST 11/23/2022 3:32 pm COMPARISON: None. HISTORY: ORDERING SYSTEM PROVIDED HISTORY: fatigue TECHNOLOGIST PROVIDED HISTORY: Reason for exam:->fatigue What reading provider will be dictating this exam?->CRC FINDINGS: The lungs are without acute focal process. There is no effusion or pneumothorax. The cardiomediastinal silhouette is without acute process. The osseous structures are without acute process. No acute process. Mildly elevated right hemidiaphragm.        Lab Results   Component Value Date/Time    WBC 12.4 11/25/2022 05:16 AM    RBC 3.42 11/25/2022 05:16 AM    RBC 4.05 03/23/2012 07:04 AM    HGB 10.7 11/25/2022 05:16 AM    HCT 32.8 11/25/2022 05:16 AM    MCV 95.7 11/25/2022 05:16 AM    MCH 31.2 11/25/2022 05:16 AM    MCHC 32.6 11/25/2022 05:16 AM    RDW 13.4 11/25/2022 05:16 AM     11/25/2022 05:16 AM    MPV 7.8 02/01/2014 10:44 AM     Lab Results   Component Value Date/Time     11/25/2022 05:16 AM    K 4.7 11/25/2022 05:16 AM     11/25/2022 05:16 AM    CO2 23 11/25/2022 05:16 AM    BUN 22 11/25/2022 05:16 AM    CREATININE 1.26 11/25/2022 05:16 AM    GFRAA 42.4 02/08/2017 11:05 AM    LABGLOM 48.1 11/25/2022 05:16 AM    GLUCOSE 88 11/25/2022 05:16 AM    GLUCOSE 96 03/15/2017 12:00 AM    PROT 6.4 11/23/2022 09:49 PM    LABALBU 2.9 11/23/2022 09:49 PM    CALCIUM 9.0 11/25/2022 05:16 AM    BILITOT 0.5 11/23/2022 09:49 PM    ALKPHOS 86 11/23/2022 09:49 PM    AST 14 11/23/2022 09:49 PM    ALT 11 11/23/2022 09:49 PM     No results found for: PROTIME, INR  Lab Results   Component Value Date/Time    TSH 0.394 11/23/2022 09:49 PM    HEYKCTBO96 >2000 11/23/2022 09:49 PM    FOLATE 12.8 11/23/2022 09:49 PM    FERRITIN 281 11/23/2022 09:49 PM    IRON 13 11/23/2022 09:49 PM    TIBC 202 11/23/2022 09:49 PM     Lab Results   Component Value Date/Time    TRIG 145 10/28/2016 12:00 AM    HDL 63 10/28/2016 12:00 AM    LDLCALC 120 10/28/2016 12:00 AM     No results found for: Stern Leonidas, LABBENZ, CANNAB, COCAINESCRN, LABMETH, OPIATESCREENURINE, PHENCYCLIDINESCREENURINE, PPXUR, ETOH  No results found for: LITHIUM, DILFRTOT, VALPROATE    Assessment:   Polyarthritis with both ankles swollen and fixation of the ankles representing foot drops secondary to difficulty move the ankles and not from the nerve. An underlying vasculitic process is a consideration is already on steroids. We will arrange for an n.p.o. and this appears to be rheumatologic. Findings only pertaining to the lower extremity the ankles and no upper extremity symptoms are noted without any ascending paralysis and therefore does not suggest GBS at least at this time. Complete autoimmune evaluation will be obtained and we will arrange for an MRI of the lumbar spine as well to make sure that this is not a spinal stenosis with secondary issues on the ankle. Depending on the results of the same will further advise. Patient CRP is elevated to 230 sed rate of 95 with elevated uric acid levels as well. Alex Giron MD, Jean Osullivan, American Board of Psychiatry & Neurology  Board Certified in Vascular Neurology  Board Certified in Neuromuscular Medicine  Certified in Neurorehabilitation             Plan:

## 2022-11-25 NOTE — CONSULTS
[address]                      [address], [address] [facility]                                  CONSULTATION    PATIENT NAME: Golden Pavon                 :        1960  MED REC NO:   [id number]                            ROOM:       W274  ACCOUNT NO:   [de-identified]                           ADMIT DATE: 2022  PROVIDER:     Lucie Granger MD    CONSULT DATE:  2022    HISTORY OF PRESENT ILLNESS:  The patient seen and evaluated for  bilateral foot and ankle pain, now with onset of foot drop, first  started on the right and now was progressed to the left and right. She  came to the emergency room due to severe pain, burning, paresthesia,  slightly elevated white count. Other laboratory markers were relatively  normal, slight elevated creatinine. She did have an elevated uric acid  level and was started on some prednisone. She has no family history of neurologic conditions or issues. She has  no history of back pain, disc herniation. There is no family evidence  of Charcot-Alyssa-Tooth disease. There is no compression, neuropathy, or  trauma. There is no skin breakdown. No prior surgeries. She has  fairly significant bunions on both sides of her first MTP and she had  paresthesias, burning sensation in both feet most likely started on the  right, now is progressed to both sides. PAST MEDICAL HISTORY:  Depression, diverticulosis, heart disease,  hypertension. PAST SURGICAL HISTORY:  She did have a back surgery, endometrial balloon  ablation, tonsillectomy, cardiac catheterization. FAMILY HISTORY:  Noncontributory for inflammatory disease, _____  disease, hematologic disorder. No recent vaccines. SOCIAL HISTORY:  No significant contributing of alcohol or drugs. MEDICATIONS:  As an outpatient include vitamin D, Os-Eamon, multivitamins. ALLERGIES OR SENSITIVITIES:  ACE INHIBITORS.     REVIEW OF SYSTEMS: Noncontributory. PHYSICAL EXAMINATION:  HEENT:  Normocephalic, atraumatic head. CHEST:  Lungs are clear. CARDIOVASCULAR:  Heart regular rhythm. ABDOMEN:  Nondistended. MUSCULOSKELETAL:  No back pain. No low back pain. No sign of  radiculopathy. No sign of disk herniation. She can do a straight leg  raise, but has weakness in both lower extremities. She has neuropathy  from the mid ankle calf down bilaterally started on the right, once the  left. There was a little bit of edema in both sides and swelling and  inflammation. Paresthesias, pain, and burning symptoms have dissipated  slightly with prednisone. At this time, she has profound foot-drop. She can lightly plantar flex her ankles and toes, but she cannot  dorsiflex. We can correct her to neutral, but it is somewhat painful  and stiff for her at this time. Appropriate workup for rheumatoid,  lupus, gout. Inflammatory markers are all being obtained. We will  consult neurologic specialist and we will follow closely. From  orthopedic standpoint, we will obtain some ankle x-rays to see what just  bony quality joint space of foot and ankle bilaterally. Obviously,  there is a severe bunion, but no recent trauma. There does not appear  to be any loculated fluid collection or any additional signs or symptoms  of active infection or abscess. Down the road the patient may need AFO bracing and/or walking boots, but  at this point she is too painful and with her feet slightly plantar  flexed. She would be unable to tolerate them at the moment. We look  forward to following up closely as some of the workup and our  consultants continue to figure out this puzzling picture of spontaneous  bilateral foot drop with paresthesias and neurologic change in both  lower extremities.         Mya Virgen MD    D: 11/25/2022 7:53:10       T: 11/25/2022 7:56:46     [email or web address]  Job#: [id number]     Doc#: [id number]    CC:

## 2022-11-25 NOTE — CONSULTS
Consult dict  Neuro cs   No trauma  Berry ft drop spontaneously over past week    No sign hx of autoimmune, charcot stephanie tooth CMT, elevated uric acid  No sign of infection    Was originally on rt but now berry    Plan    Xrs foot and ankle  Rheum w/u  Neuro w/u consult    On predisone with significant relief of pain today but still neuropathy and ft drop

## 2022-11-26 PROBLEM — M48.062 LUMBAR STENOSIS WITH NEUROGENIC CLAUDICATION: Status: ACTIVE | Noted: 2022-11-26

## 2022-11-26 PROBLEM — G82.20 PARAPARESIS OF BOTH LOWER LIMBS (HCC): Status: ACTIVE | Noted: 2022-11-26

## 2022-11-26 PROBLEM — M19.90 INFLAMMATORY ARTHRITIS: Status: ACTIVE | Noted: 2022-11-26

## 2022-11-26 LAB
ANION GAP SERPL CALCULATED.3IONS-SCNC: 8 MEQ/L (ref 9–15)
BASOPHILS ABSOLUTE: 0 K/UL (ref 0–0.2)
BASOPHILS RELATIVE PERCENT: 0.4 %
BUN BLDV-MCNC: 30 MG/DL (ref 8–23)
C-REACTIVE PROTEIN, HIGH SENSITIVITY: 213.2 MG/L (ref 0–5)
CALCIUM SERPL-MCNC: 9.1 MG/DL (ref 8.5–9.9)
CHLORIDE BLD-SCNC: 106 MEQ/L (ref 95–107)
CO2: 28 MEQ/L (ref 20–31)
CREAT SERPL-MCNC: 1.36 MG/DL (ref 0.5–0.9)
EOSINOPHILS ABSOLUTE: 0 K/UL (ref 0–0.7)
EOSINOPHILS RELATIVE PERCENT: 0 %
GFR SERPL CREATININE-BSD FRML MDRD: 43.9 ML/MIN/{1.73_M2}
GLUCOSE BLD-MCNC: 91 MG/DL (ref 70–99)
HCT VFR BLD CALC: 31.6 % (ref 37–47)
HEMOGLOBIN: 10.6 G/DL (ref 12–16)
LYMPHOCYTES ABSOLUTE: 1.1 K/UL (ref 1–4.8)
LYMPHOCYTES RELATIVE PERCENT: 9.5 %
MCH RBC QN AUTO: 31.5 PG (ref 27–31.3)
MCHC RBC AUTO-ENTMCNC: 33.5 % (ref 33–37)
MCV RBC AUTO: 94.1 FL (ref 79.4–94.8)
MONOCYTES ABSOLUTE: 0.6 K/UL (ref 0.2–0.8)
MONOCYTES RELATIVE PERCENT: 5.6 %
NEUTROPHILS ABSOLUTE: 9.7 K/UL (ref 1.4–6.5)
NEUTROPHILS RELATIVE PERCENT: 84.5 %
PDW BLD-RTO: 13.2 % (ref 11.5–14.5)
PLATELET # BLD: 360 K/UL (ref 130–400)
POTASSIUM SERPL-SCNC: 4.3 MEQ/L (ref 3.4–4.9)
RBC # BLD: 3.36 M/UL (ref 4.2–5.4)
SODIUM BLD-SCNC: 142 MEQ/L (ref 135–144)
WBC # BLD: 11.5 K/UL (ref 4.8–10.8)

## 2022-11-26 PROCEDURE — 6360000002 HC RX W HCPCS: Performed by: INTERNAL MEDICINE

## 2022-11-26 PROCEDURE — 99222 1ST HOSP IP/OBS MODERATE 55: CPT | Performed by: NEUROLOGICAL SURGERY

## 2022-11-26 PROCEDURE — 6370000000 HC RX 637 (ALT 250 FOR IP): Performed by: INTERNAL MEDICINE

## 2022-11-26 PROCEDURE — 85025 COMPLETE CBC W/AUTO DIFF WBC: CPT

## 2022-11-26 PROCEDURE — 80048 BASIC METABOLIC PNL TOTAL CA: CPT

## 2022-11-26 PROCEDURE — 2580000003 HC RX 258: Performed by: INTERNAL MEDICINE

## 2022-11-26 PROCEDURE — 86141 C-REACTIVE PROTEIN HS: CPT

## 2022-11-26 PROCEDURE — 99233 SBSQ HOSP IP/OBS HIGH 50: CPT | Performed by: PSYCHIATRY & NEUROLOGY

## 2022-11-26 PROCEDURE — 36415 COLL VENOUS BLD VENIPUNCTURE: CPT

## 2022-11-26 PROCEDURE — 1210000000 HC MED SURG R&B

## 2022-11-26 RX ORDER — ACETAMINOPHEN 325 MG/1
650 TABLET ORAL EVERY 4 HOURS PRN
Status: DISCONTINUED | OUTPATIENT
Start: 2022-11-26 | End: 2022-12-01 | Stop reason: HOSPADM

## 2022-11-26 RX ADMIN — SODIUM CHLORIDE, POTASSIUM CHLORIDE, SODIUM LACTATE AND CALCIUM CHLORIDE: 600; 310; 30; 20 INJECTION, SOLUTION INTRAVENOUS at 14:23

## 2022-11-26 RX ADMIN — PREDNISONE 40 MG: 20 TABLET ORAL at 08:29

## 2022-11-26 RX ADMIN — HYDRALAZINE HYDROCHLORIDE 25 MG: 25 TABLET, FILM COATED ORAL at 18:23

## 2022-11-26 RX ADMIN — ACETAMINOPHEN 650 MG: 325 TABLET ORAL at 10:44

## 2022-11-26 RX ADMIN — CEFTRIAXONE SODIUM 1000 MG: 1 INJECTION, POWDER, FOR SOLUTION INTRAMUSCULAR; INTRAVENOUS at 10:54

## 2022-11-26 RX ADMIN — METOPROLOL TARTRATE 25 MG: 25 TABLET, FILM COATED ORAL at 08:34

## 2022-11-26 RX ADMIN — METOPROLOL TARTRATE 25 MG: 25 TABLET, FILM COATED ORAL at 20:41

## 2022-11-26 RX ADMIN — HYDRALAZINE HYDROCHLORIDE 25 MG: 25 TABLET, FILM COATED ORAL at 08:34

## 2022-11-26 RX ADMIN — ATORVASTATIN CALCIUM 20 MG: 20 TABLET, FILM COATED ORAL at 20:39

## 2022-11-26 RX ADMIN — SODIUM CHLORIDE, POTASSIUM CHLORIDE, SODIUM LACTATE AND CALCIUM CHLORIDE: 600; 310; 30; 20 INJECTION, SOLUTION INTRAVENOUS at 22:05

## 2022-11-26 RX ADMIN — Medication 1000 UNITS: at 08:29

## 2022-11-26 RX ADMIN — Medication 500 MG: at 08:29

## 2022-11-26 RX ADMIN — SODIUM CHLORIDE, POTASSIUM CHLORIDE, SODIUM LACTATE AND CALCIUM CHLORIDE 1000 ML: 600; 310; 30; 20 INJECTION, SOLUTION INTRAVENOUS at 05:22

## 2022-11-26 RX ADMIN — ALLOPURINOL 50 MG: 100 TABLET ORAL at 08:30

## 2022-11-26 ASSESSMENT — ENCOUNTER SYMPTOMS
BACK PAIN: 1
NAUSEA: 0
EYE PAIN: 0
SHORTNESS OF BREATH: 0

## 2022-11-26 ASSESSMENT — PAIN SCALES - GENERAL: PAINLEVEL_OUTOF10: 4

## 2022-11-26 ASSESSMENT — PAIN DESCRIPTION - DESCRIPTORS: DESCRIPTORS: THROBBING

## 2022-11-26 ASSESSMENT — PAIN DESCRIPTION - LOCATION: LOCATION: HEAD

## 2022-11-26 NOTE — PROGRESS NOTES
No results for input(s): INR in the last 72 hours. Recent Labs     11/23/22  1515   CKTOTAL 123   TROPONINI <0.010       Urinalysis:      Lab Results   Component Value Date/Time    NITRU Negative 11/23/2022 03:15 PM    [address] 11/23/2022 03:15 PM    BACTERIA FEW 11/23/2022 03:15 PM    RBCUA 0-2 11/23/2022 03:15 PM    BLOODU MODERATE 11/23/2022 03:15 PM    SPECGRAV 1.022 11/23/2022 03:15 PM    GLUCOSEU Negative 11/23/2022 03:15 PM       Radiology:  US DUP LOWER EXTREMITIES BILATERAL VENOUS   Final Result   No evidence of DVT in either lower extremity. MRI LUMBAR SPINE WO CONTRAST   Final Result   1. Severe spinal canal stenosis and moderate to severe left neural foraminal   narrowing at L3-4 secondary to grade 1 anterolisthesis, disc bulge, facet   arthropathy and thickening of the ligamentum flavum. 2. Moderate spinal canal stenosis and severe right neural foraminal narrowing   at L4-5 secondary to a disc bulge, facet arthropathy and thickening of the   ligamentum flavum. 3. Mild spinal canal stenosis at L1-L2 and L2-L3, as described above. 4. Mild bilateral neural foraminal narrowing at L5-S1         XR ANKLE LEFT (MIN 3 VIEWS)   Final Result   No fracture or dislocation. Minimal plantar heel spur. XR FOOT RIGHT (MIN 3 VIEWS)   Final Result   Marked hallux valgus deformity. XR ANKLE RIGHT (MIN 3 VIEWS)   Final Result   Negative study of the right ankle. XR FOOT LEFT (MIN 3 VIEWS)   Final Result   Hilus valgus deformity no fracture or dislocation         XR CHEST PORTABLE   Final Result   No acute process. Mildly elevated right hemidiaphragm.                  Assessment/Plan:    Active Hospital Problems    Diagnosis Date Noted    Paraparesis of both lower limbs ([address] [G82.20] 11/26/2022     Priority: Medium    Lumbar stenosis with neurogenic claudication [M48.062] 11/26/2022     Priority: Medium    Inflammatory arthritis [M19.90] 11/26/2022     Priority: Medium Weakness [R53.1] 11/23/2022     Priority: Medium     B/L LE pain and swelling, Crystallopathy suspected  -Presenting with exquisite pain to the lower extremities bilaterally and inability to ambulate, inflammatory markers are elevated, also with leukocytosis  -Gout suspected, could also be pseudogout, uric acid is 3.6  -Orthopedics consulted-no plan for procedures.  -Continue 40 mg prednisone daily, start renally dosed allopurinol, will hold colchicine until renal function is improved  -DWIGHT and rheumatoid panel ordered-pending  -MRI lumbar ordered by neurology-MRI showed spinal stenosis that is severe, seen by neurosurgeon, recommended surgery but patient is hesitant. UTI  -Resume rocephin   -Noted pansensitive E. coli on the urine studies. Will treat for 3 days. SHLOMO  -Cr 1.6-->1.2, suspect decreased intake at home as patient was reportedly bedbound for several days.  -Continue IV fluid hydration and follow serial BMP    Leukocytosis: Suspect reactive to gout, monitor, if patient has fever obtain blood cultures x2 and panculture          Additional work up or/and treatment plan may be added today or then after based on clinical progression. I am managing a portion of pt care. Some medical issues are handled by other specialists. Additional work up and treatment should be done in out pt setting by pt PCP and other out pt providers. Diet: ADULT DIET;  Regular    PT/OT Eval         Electronically signed by John Mcneil DO on 11/26/2022 at 1:40 PM

## 2022-11-26 NOTE — CONSULTS
Patient Name: Ki Castro Date: 2022  2:53 PM  MR #: [id number]  : 1960    Attending Physician: Eliezer Horner DO  Reason for consult: Paraparesis    History of Presenting Illness and Review of Kiana Fatima is a 58 y.o. female on hospital day 1 . History Of Present Illness: Presents female who has had gait deterioration over these last 5 years inability to ambulate distances. Starting lean on the grocery cart not able to walk at a good pace fully and progressively worsening attributed to just \"getting older\". Has begun to limit activities secondary to pain discomfort lower extremities. Presents with weakness swelling both lower extremities from disuse right greater than left. Unable to ambulate      History:      Past Medical History:   Diagnosis Date    ACE-inhibitor cough     Colon polyp     Depression     Diverticulosis     Family history of ischemic heart disease     High cholesterol     Hypertension     Palpitations     Vaginal dryness      Past Surgical History:   Procedure Laterality Date    BACK SURGERY      disk removal    CARDIAC CATHETERIZATION      Had infusion of wrong fluids with ablation. ..flash pulmonary edema. Shani Langston on vent x 4 hours in ICU. ..cardiac cath to follow negative    ENDOMETRIAL ABLATION      TONSILLECTOMY      WISDOM TOOTH EXTRACTION         Family History  Family History   Problem Relation Age of Onset    High Blood Pressure Mother     Cancer Father         skin, pancratic, espogas    Heart Disease Father     Cancer Maternal Grandmother         breast    High Blood Pressure Maternal Grandmother     High Blood Pressure Brother     Diabetes Other      [] Unable to obtain due to ventilated and/ or neurologic status    Social History     Socioeconomic History    Marital status:      Spouse name: Not on file    Number of children: Not on file    Years of education: Not on file    Highest education level: Not on file   Occupational History Occupation: accounts payable   Tobacco Use    Smoking status: Never    Smokeless tobacco: Never   Substance and Sexual Activity    Alcohol use: Yes     Alcohol/week: 0.0 standard drinks     Comment: social    Drug use: No    Sexual activity: Not Currently     Partners: Male   Other Topics Concern    Not on file   Social History Narrative    Not on file     Social Determinants of Health     Financial Resource Strain: Low Risk     Difficulty of Paying Living Expenses: Not hard at all   Food Insecurity: No Food Insecurity    Worried About Running Out of Food in the Last Year: Never true    [address] in the Last Year: Never true   Transportation Needs: No Transportation Needs    Lack of Transportation (Medical): No    Lack of Transportation (Non-Medical): No   Physical Activity: Not on file   Stress: Not on file   Social Connections: Not on file   Intimate Partner Violence: Not on file   Housing Stability: Not on file      [] Unable to obtain due to ventilated and/ or neurologic status    Home Medications:      Medications Prior to Admission: atorvastatin (LIPITOR) 20 MG tablet, Take 20 mg by mouth daily  hydrALAZINE (APRESOLINE) 25 MG tablet, Take 25 mg by mouth in the morning and 25 mg in the evening. metoprolol tartrate (LOPRESSOR) 25 MG tablet, Take 25 mg by mouth 2 times daily  benzonatate (TESSALON PERLES) 100 MG capsule, Take 2 capsules by mouth 3 times daily as needed for Cough  vitamin D (CHOLECALCIFEROL) 1000 UNIT TABS tablet, Take 1,000 Units by mouth daily  calcium carbonate (OSCAL) 500 MG TABS tablet, Take 500 mg by mouth daily  Multiple Vitamins-Minerals (MULTI COMPLETE PO), Take 1 tablet by mouth daily.       Current Hospital Medications:     Scheduled Meds:   cefTRIAXone (ROCEPHIN) IV  1,000 mg IntraVENous Q24H    atorvastatin  20 mg Oral Nightly    calcium elemental  500 mg Oral Daily    hydrALAZINE  25 mg Oral BID    metoprolol tartrate  25 mg Oral BID    Vitamin D  1,000 Units Oral Daily allopurinol  50 mg Oral Daily    predniSONE  40 mg Oral Daily     Continuous Infusions:   lactated ringers 1,000 mL (11/26/22 0522)     PRN Meds:.acetaminophen, benzonatate, oxyCODONE  .   lactated ringers 1,000 mL (11/26/22 0522)        Allergies: Allergies   Allergen Reactions    Ace Inhibitors Other (See Comments)     Cough            REVIEW OF SYSTEMS    (2-9 systems for level 4, 10 or more for level 5)     Review of Systems   Constitutional:  Negative for fever. HENT:  Negative for hearing loss. Eyes:  Negative for pain. Respiratory:  Negative for shortness of breath. Gastrointestinal:  Negative for nausea. Endocrine: Negative for heat intolerance. Genitourinary:  Negative for difficulty urinating. Musculoskeletal:  Positive for back pain and gait problem. Negative for neck pain. Skin:  Negative for rash. Allergic/Immunologic: Negative for immunocompromised state. Neurological:  Positive for weakness. Negative for headaches. Psychiatric/Behavioral:  Negative for sleep disturbance. Except as noted above the remainder of the review of systems was reviewed and negative. Physical Exam     BP (!) 155/72   Pulse 74   Temp 97.7 °F (36.5 °C) (Oral)   Resp 18   Ht 5' 5.98\" (1.676 m)   Wt 220 lb (99.8 kg)   SpO2 98%   BMI 35.53 kg/m²   Body mass index is 35.53 kg/m². Physical Exam  Vitals and nursing note reviewed. Constitutional:       Appearance: Normal appearance. HENT:      Head: Normocephalic and atraumatic. Right Ear: External ear normal.      Left Ear: External ear normal.      Nose: Nose normal.      Mouth/Throat:      Pharynx: Oropharynx is clear. Eyes:      Extraocular Movements: Extraocular movements intact. Cardiovascular:      Pulses: Normal pulses. Pulmonary:      Effort: Pulmonary effort is normal.   Abdominal:      Palpations: Abdomen is soft. Genitourinary:     Rectum: Normal.   Musculoskeletal:         General: Normal range of motion. Cervical back: Normal range of motion. Comments: Mild swelling both lower extremities secondary to inability to move feet. Skin:     General: Skin is warm. Neurological:      General: No focal deficit present. Motor: Weakness present. Gait: Gait abnormal.      Comments: Paraparetic both lower extremities right 3/5 left 4/5. Weakness worse distally for foot dorsiflexion's bilaterally at 2/5 profound bilateral foot drop.  sensation  intact. Absent reflexes. Psychiatric:         Mood and Affect: Mood normal.        I have reviewed all laboratory studies, reports, data, and pertinent images.     Objective Findings:     Vitals:   Vitals:    11/24/22 0840 11/25/22 2038 11/26/22 0802 11/26/22 0834   BP:  (!) 148/72 (!) 156/62 (!) 155/72   Pulse: (!) 101 87 70 74   Resp:   18    Temp:   97.7 °F (36.5 °C)    TempSrc:   Oral    SpO2:   98%    Weight:       Height:            Laboratory, Microbiology, Pathology, Radiology, Cardiology, Medications and Transcriptions reviewed  Scheduled Meds:   cefTRIAXone (ROCEPHIN) IV  1,000 mg IntraVENous Q24H    atorvastatin  20 mg Oral Nightly    calcium elemental  500 mg Oral Daily    hydrALAZINE  25 mg Oral BID    metoprolol tartrate  25 mg Oral BID    Vitamin D  1,000 Units Oral Daily    allopurinol  50 mg Oral Daily    predniSONE  40 mg Oral Daily     Continuous Infusions:   lactated ringers 1,000 mL (11/26/22 0522)       Recent Labs     11/24/22  0451 11/25/22  0516 11/26/22  0512   WBC 13.0* 12.4* 11.5*   HGB 10.3* 10.7* 10.6*   HCT 30.8* 32.8* 31.6*   MCV 94.9* 95.7* 94.1    290 360     Recent Labs     11/24/22  0451 11/25/22  0516 11/26/22  0512    142 142   K 3.9 4.7 4.3    108* 106   CO2 18* 23 28   BUN 22 22 30*   CREATININE 1.43* 1.26* 1.36*     Recent Labs     11/23/22  1515 11/23/22  2149   AST 23 14   ALT 13 11   BILIDIR  --  <0.2   BILITOT 0.6 0.5   ALKPHOS 100 86     No results for input(s): LIPASE, AMYLASE in the last 72 hours. Recent Labs     11/23/22  1515 11/23/22  2149   PROT 7.2 6.4     XR ANKLE LEFT (MIN 3 VIEWS)    Result Date: 11/25/2022  EXAMINATION: THREE XRAY VIEWS OF THE LEFT ANKLE 11/25/2022 9:20 am COMPARISON: None. HISTORY: ORDERING SYSTEM PROVIDED HISTORY: acute pain with inability to ambulate TECHNOLOGIST PROVIDED HISTORY: Reason for exam:->acute pain with inability to ambulate What reading provider will be dictating this exam?->CRC FINDINGS: There is no fracture or dislocation. No soft tissue swelling identified. The ankle mortise is unremarkable. There is minimal plantar heel spur. No fracture or dislocation. Minimal plantar heel spur. XR ANKLE RIGHT (MIN 3 VIEWS)    Result Date: 11/25/2022  EXAMINATION: THREE XRAY VIEWS OF THE RIGHT ANKLE 11/25/2022 9:20 am COMPARISON: None. HISTORY: ORDERING SYSTEM PROVIDED HISTORY: acute pain with inability to bear weight TECHNOLOGIST PROVIDED HISTORY: Reason for exam:->acute pain with inability to bear weight What reading provider will be dictating this exam?->CRC FINDINGS: There is no fracture or dislocation. No soft tissue swelling identified. The ankle mortise and subtalar joint are unremarkable. Negative study of the right ankle. XR FOOT LEFT (MIN 3 VIEWS)    Result Date: 11/25/2022  EXAMINATION: THREE XRAY VIEWS OF THE LEFT FOOT 11/25/2022 9:20 am COMPARISON: None. HISTORY: ORDERING SYSTEM PROVIDED HISTORY: acute pain with inability to bear weight TECHNOLOGIST PROVIDED HISTORY: Reason for exam:->acute pain with inability to bear weight What reading provider will be dictating this exam?->CRC FINDINGS: There is moderate hallux valgus deformity. A fracture or dislocation is not seen. There is minimal soft tissue swelling adjacent to the 5th metatarsophalangeal joint.   Minimal plantar heel spur present     Hilus valgus deformity no fracture or dislocation     XR FOOT RIGHT (MIN 3 VIEWS)    Result Date: 11/25/2022  EXAMINATION: THREE XRAY VIEWS OF THE RIGHT FOOT 11/25/2022 9:20 am COMPARISON: None. HISTORY: ORDERING SYSTEM PROVIDED HISTORY: acute pain with inability to ambulate TECHNOLOGIST PROVIDED HISTORY: Reason for exam:->acute pain with inability to ambulate What reading provider will be dictating this exam?->CRC FINDINGS: There is marked hallux valgus deformity but no fracture or dislocation seen. Minimal soft tissue swelling dorsal aspect of the right foot. Marked hallux valgus deformity. MRI LUMBAR SPINE WO CONTRAST    Result Date: 11/25/2022  EXAMINATION: MRI OF THE LUMBAR SPINE WITHOUT CONTRAST, 11/25/2022 2:46 pm TECHNIQUE: Multiplanar multisequence MRI of the lumbar spine was performed without the administration of intravenous contrast. COMPARISON: None. HISTORY: ORDERING SYSTEM PROVIDED HISTORY: weakness TECHNOLOGIST PROVIDED HISTORY: Reason for exam:->weakness What is the sedation requirement?->None What reading provider will be dictating this exam?->CRC FINDINGS: BONES/ALIGNMENT: There is grade 1 anterolisthesis of L3 relative to L4 measuring 2 mm. Alignment is otherwise normal.  There is no acute fracture. There is no spondylolysis. There is disc desiccation from L3-4 to L5-S1. Severe disc space narrowing is present at L5-S1. Bone marrow signal intensity is normal. SPINAL CORD: The conus medullaris is normal in size and signal intensities and terminates normally. SOFT TISSUES: No paraspinal mass is identified. L1-L2: There is a disc bulge and facet arthropathy. There is mild spinal canal stenosis. No significant neural foraminal narrowing is present. L2-L3: There is a disc bulge, facet arthropathy and thickening of the ligamentum flavum. There is mild spinal canal stenosis. No significant neural foraminal narrowing is present. L3-L4: There is grade 1 anterolisthesis. There is a disc bulge, facet arthropathy and thickening of the ligamentum flavum.   There is severe spinal canal stenosis and moderate to severe left neural foraminal narrowing. No significant right neural foraminal narrowing is present. L4-L5: There is a disc bulge, facet arthropathy and thickening of the ligamentum flavum. There is moderate spinal canal stenosis and severe right neural foraminal narrowing. No significant left neural foraminal narrowing is present. L5-S1: The sequelae of left hemilaminectomy is noted. There is a disc bulge and posterior osteophyte formation resulting in mild bilateral neural foraminal narrowing. No significant spinal canal stenosis is present. 1. Severe spinal canal stenosis and moderate to severe left neural foraminal narrowing at L3-4 secondary to grade 1 anterolisthesis, disc bulge, facet arthropathy and thickening of the ligamentum flavum. 2. Moderate spinal canal stenosis and severe right neural foraminal narrowing at L4-5 secondary to a disc bulge, facet arthropathy and thickening of the ligamentum flavum. 3. Mild spinal canal stenosis at L1-L2 and L2-L3, as described above. 4. Mild bilateral neural foraminal narrowing at L5-S1       XR CHEST PORTABLE    Result Date: 11/23/2022  EXAMINATION: ONE XRAY VIEW OF THE CHEST 11/23/2022 3:32 pm COMPARISON: None. HISTORY: ORDERING SYSTEM PROVIDED HISTORY: fatigue TECHNOLOGIST PROVIDED HISTORY: Reason for exam:->fatigue What reading provider will be dictating this exam?->CRC FINDINGS: The lungs are without acute focal process. There is no effusion or pneumothorax. The cardiomediastinal silhouette is without acute process. The osseous structures are without acute process. No acute process. Mildly elevated right hemidiaphragm. US DUP LOWER EXTREMITIES BILATERAL VENOUS    Result Date: 11/25/2022  EXAMINATION: DUPLEX VENOUS ULTRASOUND OF THE BILATERAL LOWER Babetta Clunes 3:20 pm TECHNIQUE: Duplex ultrasound using B-mode/gray scaled imaging, Doppler spectral analysis and color flow Doppler was obtained of the deep venous structures of the lower bilateral extremities.  COMPARISON: None. HISTORY: ORDERING SYSTEM PROVIDED HISTORY: pain TECHNOLOGIST PROVIDED HISTORY: Reason for exam:->pain What reading provider will be dictating this exam?->CRC FINDINGS: The visualized veins of the bilateral lower extremities are patent and free of echogenic thrombus. The veins demonstrate good compressibility with normal color flow study and spectral analysis. No evidence of DVT in either lower extremity. 11/26/2022  normal less than 5. WBC 11.5.  11/23/2022 sed rate 95 normal less than 30. Rheumatoid arthritis diagnostic panel pending. 11/23/2022 uric acid 8.5 normal less than 5.7. Impression:   Severe L3-4 canal stenosis with cauda equina compression correlates with her paraparesis  Acute inflammatory bilateral ankle arthropathy with associated paraparesis right greater than left with profound 1/5 foot drop      Plan:   Evaluate and treat for her inflammatory process including gout rheumatoid arthritis lupus and so forth. Right and bilateral L3-4 microdissection decompression after medical assessment    The surgical/medical procedure, indications and risks have been discussed. There is a low chance of having any type of risk, but risks are still present including serious risks as pain, bleeding, infection, death, paralysis, sensory loss, bladder bowel and sexual dysfunction, chance of recurrence and so forth. Surgery is not a guarantee of normalcy. We cannot undo any permanent damage already done. We cannot change the course of any of the other medical diseases. I have discussed alternative procedures, risks and benefits. I have answered all questions. There is understanding and agreement to proceed. Comments:     Plan surgery after medical assessment.         Electronically signed by Nadiya Funes MD on 11/26/2022 at 11:05 AM

## 2022-11-26 NOTE — PROGRESS NOTES
Neurology Follow up    SUBJECTIVE:  NO Headache, double vision,blurry vision,difficulty with speech,difficulty with swallowing,weakness,numbness,pain,nausea,vomitting,chocking,neck pain,dizziness,    She still has considerable pain in the ankles though her left leg appears to be improved considerably in terms of strength and pain.   She has back issues as well    Current Facility-Administered Medications   Medication Dose Route Frequency Provider Last Rate Last Admin    acetaminophen (TYLENOL) tablet 650 mg  650 mg Oral Q4H PRN Dorothy New Darío, DO   650 mg at 11/26/22 1044    cefTRIAXone (ROCEPHIN) 1,000 mg in dextrose 5 % 50 mL IVPB mini-bag  1,000 mg IntraVENous Q24H Pink See, DO   Stopped at 11/26/22 1131    atorvastatin (LIPITOR) tablet 20 mg  20 mg Oral Nightly Pink See, DO   20 mg at 11/25/22 2038    benzonatate (TESSALON) capsule 200 mg  200 mg Oral TID PRN Pink See, DO        calcium elemental (OSCAL) tablet 500 mg  500 mg Oral Daily Pink See, DO   500 mg at 11/26/22 4615    hydrALAZINE (APRESOLINE) tablet 25 mg  25 mg Oral BID Pink See, DO   25 mg at 11/26/22 0834    metoprolol tartrate (LOPRESSOR) tablet 25 mg  25 mg Oral BID Pink See, DO   25 mg at 11/26/22 7576    vitamin D (CHOLECALCIFEROL) tablet 1,000 Units  1,000 Units Oral Daily Pink See, DO   1,000 Units at 11/26/22 2837    oxyCODONE (ROXICODONE) immediate release tablet 5 mg  5 mg Oral Q6H PRN Haze Pointer, DO   5 mg at 11/25/22 0800    allopurinol (ZYLOPRIM) tablet 50 mg  50 mg Oral Daily Haze Pointer, DO   50 mg at 11/26/22 0830    lactated ringers infusion   IntraVENous Continuous Will Oumar Sedar,  mL/hr at 11/26/22 1132 Restarted at 11/26/22 1132    predniSONE (DELTASONE) tablet 40 mg  40 mg Oral Daily Sergei Ortiz, DO   40 mg at 11/26/22 0829       PHYSICAL EXAM:    BP (!) 155/72   Pulse 74   Temp 97.7 °F (36.5 °C) (Oral)   Resp 18   Ht 5' 5.98\" (1.676 m)   Wt 220 lb (99.8 kg)   SpO2 98% BMI 35.53 kg/m²    General Appearance:      Skin:  normal  CVS - Normal sounds, No murmurs , No carotid Bruits  RS -CTA  Abdomen Soft, BS present  Review of Systems   Mental Status Exam:             Level of Alertness:   awake            Orientation:   person, place, time                      Attention/Concentration:  normal            Language:  normal      Funduscopic Exam:     Cranial Nerves            Cranial nerve III           Pupils:  equal, round, reactive to light      Cranial nerves III, IV, VI           Extraocular Movements: intact      Cranial nerve V           Facial sensation:  intact      Cranial nerve VII           Facial strength: intact      Cranial nerve VIII           Hearing:  intact      Cranial nerve IX           Palate:  intact      Cranial nerve XI         Shoulder shrug:  intact      Cranial nerve XII          Tongue movement:  normal    Motor: Foot drop notable on the right though this is secondary to immobility of the ankle and not a true neurogenic foot drop. Swelling is notable and second tenderness Multiple the ankles.   The left leg shows improvement since yesterday patient is quite hyperreflexic throughout            Sensory: No definite sensory levels        Pinprick             Right Upper Extremity:  normal             Left Upper Extremity:  normal             Right Lower Extremity:  normal             Left Lower Extremity:  normal           Vibration                         Touch            Proprioception                 Coordination:           Finger/Nose   Right:  normal              Left:  normal                  Gait:                       Casual: Gait is deferred          Reflexes:             Deep Tendon Reflexes:             Reflexes are 2 +             Plantar response:                Right:  downgoing               Left:  downgoing    Vascular:  Cardiac Exam:  normal         XR CHEST PORTABLE    Result Date: 11/23/2022  EXAMINATION: ONE XRAY VIEW OF THE CHEST 11/23/2022 3:32 pm COMPARISON: None. HISTORY: ORDERING SYSTEM PROVIDED HISTORY: fatigue TECHNOLOGIST PROVIDED HISTORY: Reason for exam:->fatigue What reading provider will be dictating this exam?->CRC FINDINGS: The lungs are without acute focal process. There is no effusion or pneumothorax. The cardiomediastinal silhouette is without acute process. The osseous structures are without acute process. No acute process. Mildly elevated right hemidiaphragm. Recent Labs     11/24/22  0451 11/25/22  0516 11/26/22  0512   WBC 13.0* 12.4* 11.5*   HGB 10.3* 10.7* 10.6*    290 360     Recent Labs     11/24/22  0451 11/25/22  0516 11/26/22  0512    142 142   K 3.9 4.7 4.3    108* 106   CO2 18* 23 28   BUN 22 22 30*   CREATININE 1.43* 1.26* 1.36*   GLUCOSE 78 88 91     Recent Labs     11/23/22  1515 11/23/22  2149   BILITOT 0.6 0.5   ALKPHOS 100 86   AST 23 14   ALT 13 11     No results found for: PROTIME, INR  No results found for: LITHIUM, DILFRTOT, VALPROATE    ASSESSMENT AND PLAN  Polyarthritis of rheumatologic origin with swelling of the ankles with fixation of the ankles and pain. Findings not quite history of Guillain-Barré syndrome given the underlying clinical profile and examination. I had suspected a lumbar canal stenosis given her underlying findings and patient has severe lumbar spinal canal stenosis. Neurosurgery is on consult surgery is contemplated. Her arthritic evaluation is still in progress and we await some rheumatological markers and patient continue on steroids for now. His left leg is improved considerably. Alex Valdez MD, Mozella Babinski, American Board of Psychiatry & Neurology  Board Certified in Vascular Neurology  Board Certified in Neuromuscular Medicine  Certified in [facility]

## 2022-11-26 NOTE — PROGRESS NOTES
Physical Therapy Med Surg Initial Assessment  Facility/Department: [facility]  Room: [facility]       NAME: Nithin Ellington  : 1960 (58 y.o.)  MRN: [id number]  CODE STATUS: Full Code    Date of Service: 2022    Patient Diagnosis(es): Dehydration [E86.0]  Weakness [R53.1]  Generalized weakness [R53.1]  Paresthesia of foot, bilateral [R20.2]   Chief Complaint   Patient presents with    Illness     Pt has been feeling sick for 3 days (pt having chills, fever, and diarrhea). Pt has gotten out of bed for 3 days      Patient Active Problem List    Diagnosis Date Noted    Paraparesis of both lower limbs ([address] 2022    Lumbar stenosis with neurogenic claudication 2022    Inflammatory arthritis 2022    Weakness 2022    High cholesterol     Hypertension     Depression     Family history of ischemic heart disease     Palpitations         Past Medical History:   Diagnosis Date    ACE-inhibitor cough     Colon polyp     Depression     Diverticulosis     Family history of ischemic heart disease     High cholesterol     Hypertension     Palpitations     Vaginal dryness      Past Surgical History:   Procedure Laterality Date    BACK SURGERY      disk removal    CARDIAC CATHETERIZATION      Had infusion of wrong fluids with ablation. ..flash pulmonary edema. Mariana Holgate on vent x 4 hours in ICU. ..cardiac cath to follow negative    ENDOMETRIAL ABLATION      TONSILLECTOMY      WISDOM TOOTH EXTRACTION         Chart Reviewed: Yes    Restrictions:  Restrictions/Precautions: Fall Risk     SUBJECTIVE:   Subjective: Has a dull pain in geno feet that has been there 4/10. Pt was unable to walk for ~2 weeks prior to admission.     Pain   Pre and post pain 4/10 in geno feet    Prior Level of Function:  Social/Functional History  Lives With:  (Roommate)  Type of Home:  (Duplex)  Home Layout: One level  Home Access: Stairs to enter with rails  Entrance Stairs - Number of Steps: 4  Entrance Stairs - Rails: Both  Home Equipment: Wallene Veronica, Crutches  ADL Assistance: Independent  Homemaking Assistance: Independent  Ambulation Assistance: Independent  Transfer Assistance: Independent  Active : Yes  Occupation: Full time employment  Type of Occupation:     OBJECTIVE:   Vision  Vision: Impaired  Vision Exceptions: Wears glasses at all times  Hearing: Within functional limits    Cognition:  Overall Orientation Status: Within Normal Limits  Follows Commands: Within Functional Limits    ROM:  RLE General AROM: Slightly limited AROM d/t decreased strength  LLE AROM : WFL    Strength:  Strength RLE  Comment: >/= 3+/5 functionally assessed  Strength LLE  Comment: >/= 3+/5 functionally assessed    Neuro:  Balance  Sitting - Static: Good  Sitting - Dynamic: Good  Standing - Static: Fair;-  Standing - Dynamic: Poor  Comments: Heavy reliance on [employer] with post COG, able to take single steps FWD and laterally with difficulty WBing on Rt LE vs Lt    Bed mobility  Supine to Sit: Stand by assistance  Sit to Supine: Stand by assistance    Transfers  Sit to Stand: Moderate Assistance;Minimal Assistance;Contact guard assistance (Decreasing A by 3rd STS)  Stand to Sit: Minimal Assistance;Contact guard assistance      Activity Tolerance  Activity Tolerance: Patient tolerated evaluation without incident    ASSESSMENT:   Body Structures, Functions, Activity Limitations Requiring Skilled Therapeutic Intervention: Decreased functional mobility ; Decreased strength;Decreased balance;Decreased endurance;Decreased ROM  Decision Making: High Complexity  History: high  Exam: high  Clinical Presentation: high    Therapy Prognosis: Good         DISCHARGE RECOMMENDATIONS:  Discharge Recommendations: Continue to assess pending progress, Therapy recommended at discharge    Assessment: Pt evaluated for mobility needs while in the inpatient setting. Pt demonstrates significant decreased strength in geno LEs with Rt LE worse than Lt.   Pt able to complete bed mobility but requires increased A to complete STS transfers. Pt requires less A after educated on technique to complete STS transfers. Pt able to take a single step FWD and lateral but unable to ambulate during evaluation. Pt would benefit from further skilled PT to improve her strength, balance and safety with all mobility. Requires PT Follow-Up: Yes       PLAN OF CARE:  Physcial Therapy Plan  General Plan: 1 time a day 3-6 times a week  Current Treatment Recommendations: Strengthening, ROM, Balance training, Functional mobility training, Transfer training, Gait training, Stair training, Neuromuscular re-education, Manual Therapy - Soft Tissue Mobilization, Return to work related activity, Home exercise program, Patient/Caregiver education & training, Equipment evaluation, education, & procurement, Safety education & training    Safety Devices  Type of Devices: Bed alarm in place, All fall risk precautions in place, Call light within reach    Goals:  Short Term Goals  Short Term Goal 1: Pt will be able to complete all transfers with SBA. Short Term Goal 2: Pt will demonstrate Fair+ static and dynamic stnading balance. Short Term Goal 3: Pt will be able to ambulate >/= 48' with LRD with good stabiltiy and foot clearance CGA. Short Term Goal 4: Improve geno LE strength to increase ease with all transfers and mobility. [facility] (6 CLICK) BASIC MOBILITY  AM-PAC Inpatient Mobility Raw Score : 13     Therapy Time:   Individual   Time In 1250   Time Out 1317   Minutes [address], PT, 11/26/22 at 1:25 PM         Definitions for assistance levels  Independent = pt does not require any physical supervision or assistance from another person for activity completion. Device may be needed.   Stand by assistance = pt requires verbal cues or instructions from another person, close to but not touching, to perform the activity  Minimal assistance= pt performs 75% or more of the activity; assistance is required to complete the activity  Moderate assistance= pt performs 50% of the activity; assistance is required to complete the activity  Maximal assistance = pt performs 25% of the activity; assistance is required to complete the activity  Dependent = pt requires total physical assistance to accomplish the task

## 2022-11-26 NOTE — FLOWSHEET NOTE
Pt. Had a headache earlier today. Pt. Rec'd Tylenol. Pt. Stated that she would like to be seen by physical therapy, pt stated that she has been in the bed for too long and would like to be evaluated by pt.  An order was rec'd for a pt eval.

## 2022-11-26 NOTE — PROGRESS NOTES
Orthopedics:    Patient feeling much better today on oral prednisone/premedication    Bilateral ankle: Mild swelling. Improved since yesterday. Assessment/plan. 1.  Bilateral ankle pain. Improving  2. Foot drop:  Will require additional neurological work-up    Orthopedic sign off

## 2022-11-27 LAB
ANA IGG, ELISA: DETECTED
ANION GAP SERPL CALCULATED.3IONS-SCNC: 12 MEQ/L (ref 9–15)
BASOPHILS ABSOLUTE: 0 K/UL (ref 0–0.2)
BASOPHILS RELATIVE PERCENT: 0.3 %
BUN BLDV-MCNC: 28 MG/DL (ref 8–23)
CALCIUM SERPL-MCNC: 9 MG/DL (ref 8.5–9.9)
CHLORIDE BLD-SCNC: 104 MEQ/L (ref 95–107)
CO2: 26 MEQ/L (ref 20–31)
CREAT SERPL-MCNC: 1.27 MG/DL (ref 0.5–0.9)
ENA TO SSB (LA) ANTIBODY: 0 AU/ML (ref 0–40)
EOSINOPHILS ABSOLUTE: 0 K/UL (ref 0–0.7)
EOSINOPHILS RELATIVE PERCENT: 0.1 %
GFR SERPL CREATININE-BSD FRML MDRD: 47.6 ML/MIN/{1.73_M2}
GLUCOSE BLD-MCNC: 77 MG/DL (ref 70–99)
HCT VFR BLD CALC: 32.5 % (ref 37–47)
HEMOGLOBIN: 10.7 G/DL (ref 12–16)
LYMPHOCYTES ABSOLUTE: 1.5 K/UL (ref 1–4.8)
LYMPHOCYTES RELATIVE PERCENT: 17.1 %
MCH RBC QN AUTO: 30.9 PG (ref 27–31.3)
MCHC RBC AUTO-ENTMCNC: 32.8 % (ref 33–37)
MCV RBC AUTO: 94.2 FL (ref 79.4–94.8)
MONOCYTES ABSOLUTE: 0.6 K/UL (ref 0.2–0.8)
MONOCYTES RELATIVE PERCENT: 6.9 %
MYELOPEROXIDASE AB: 7 AU/ML (ref 0–19)
NEUTROPHILS ABSOLUTE: 6.7 K/UL (ref 1.4–6.5)
NEUTROPHILS RELATIVE PERCENT: 75.6 %
PDW BLD-RTO: 13.2 % (ref 11.5–14.5)
PLATELET # BLD: 424 K/UL (ref 130–400)
POTASSIUM SERPL-SCNC: 4 MEQ/L (ref 3.4–4.9)
RBC # BLD: 3.45 M/UL (ref 4.2–5.4)
SERINE PROTEASE 3 AB: 0 AU/ML (ref 0–19)
SODIUM BLD-SCNC: 142 MEQ/L (ref 135–144)
SSA 52 (RO) (ENA) AB, IGG: 1 AU/ML (ref 0–40)
WBC # BLD: 8.9 K/UL (ref 4.8–10.8)

## 2022-11-27 PROCEDURE — 85025 COMPLETE CBC W/AUTO DIFF WBC: CPT

## 2022-11-27 PROCEDURE — 36415 COLL VENOUS BLD VENIPUNCTURE: CPT

## 2022-11-27 PROCEDURE — 6370000000 HC RX 637 (ALT 250 FOR IP): Performed by: INTERNAL MEDICINE

## 2022-11-27 PROCEDURE — 99233 SBSQ HOSP IP/OBS HIGH 50: CPT | Performed by: PSYCHIATRY & NEUROLOGY

## 2022-11-27 PROCEDURE — 1210000000 HC MED SURG R&B

## 2022-11-27 PROCEDURE — 97535 SELF CARE MNGMENT TRAINING: CPT

## 2022-11-27 PROCEDURE — 2580000003 HC RX 258: Performed by: INTERNAL MEDICINE

## 2022-11-27 PROCEDURE — 6360000002 HC RX W HCPCS: Performed by: INTERNAL MEDICINE

## 2022-11-27 PROCEDURE — 80048 BASIC METABOLIC PNL TOTAL CA: CPT

## 2022-11-27 RX ORDER — LOPERAMIDE HYDROCHLORIDE 2 MG/1
2 CAPSULE ORAL 4 TIMES DAILY PRN
Status: DISCONTINUED | OUTPATIENT
Start: 2022-11-27 | End: 2022-12-01 | Stop reason: HOSPADM

## 2022-11-27 RX ADMIN — HYDRALAZINE HYDROCHLORIDE 25 MG: 25 TABLET, FILM COATED ORAL at 08:37

## 2022-11-27 RX ADMIN — Medication 500 MG: at 08:39

## 2022-11-27 RX ADMIN — PREDNISONE 40 MG: 20 TABLET ORAL at 08:36

## 2022-11-27 RX ADMIN — LOPERAMIDE HYDROCHLORIDE 2 MG: 2 CAPSULE ORAL at 18:30

## 2022-11-27 RX ADMIN — Medication 1000 UNITS: at 08:38

## 2022-11-27 RX ADMIN — CEFTRIAXONE SODIUM 1000 MG: 1 INJECTION, POWDER, FOR SOLUTION INTRAMUSCULAR; INTRAVENOUS at 12:40

## 2022-11-27 RX ADMIN — HYDRALAZINE HYDROCHLORIDE 25 MG: 25 TABLET, FILM COATED ORAL at 18:30

## 2022-11-27 RX ADMIN — METOPROLOL TARTRATE 25 MG: 25 TABLET, FILM COATED ORAL at 08:37

## 2022-11-27 RX ADMIN — METOPROLOL TARTRATE 25 MG: 25 TABLET, FILM COATED ORAL at 19:45

## 2022-11-27 RX ADMIN — SODIUM CHLORIDE, POTASSIUM CHLORIDE, SODIUM LACTATE AND CALCIUM CHLORIDE: 600; 310; 30; 20 INJECTION, SOLUTION INTRAVENOUS at 18:33

## 2022-11-27 RX ADMIN — SODIUM CHLORIDE, POTASSIUM CHLORIDE, SODIUM LACTATE AND CALCIUM CHLORIDE: 600; 310; 30; 20 INJECTION, SOLUTION INTRAVENOUS at 07:19

## 2022-11-27 RX ADMIN — ALLOPURINOL 50 MG: 100 TABLET ORAL at 08:38

## 2022-11-27 RX ADMIN — ATORVASTATIN CALCIUM 20 MG: 20 TABLET, FILM COATED ORAL at 19:45

## 2022-11-27 NOTE — FLOWSHEET NOTE
Pt. Was up out of bed and used the [facility] and sat in a chair for over an hour. Pt. Later ambulated to the bed. Pt. Still has some weakness but managed to take several steps to the bed. Pt. Is excited that she is able to move her feet.

## 2022-11-27 NOTE — PROGRESS NOTES
Patient alert and oriented pleasant and cooperative denies any pain or discomfort at this time jami wick on per patient request.

## 2022-11-27 NOTE — PROGRESS NOTES
Neurology Follow up    SUBJECTIVE:  NO Headache, double vision,blurry vision,difficulty with speech,difficulty with swallowing,weakness,numbness,pain,nausea,vomitting,chocking,neck pain,dizziness,    She still has considerable pain in the ankles though her left leg appears to be improved considerably in terms of strength and pain. She has back issues as well    Patient she is sitting in the chair is not able to move her ankles bilaterally even the right side.   marvin is less    Current Facility-Administered Medications   Medication Dose Route Frequency Provider Last Rate Last Admin    acetaminophen (TYLENOL) tablet 650 mg  650 mg Oral Q4H PRN Deins Chanel, DO   650 mg at 11/26/22 1044    cefTRIAXone (ROCEPHIN) 1,000 mg in dextrose 5 % 50 mL IVPB mini-bag  1,000 mg IntraVENous Q24H Avisjanine Rankinff, DO   Stopped at 11/26/22 1131    atorvastatin (LIPITOR) tablet 20 mg  20 mg Oral Nightly Cleora Sandhoff, DO   20 mg at 11/26/22 2039    benzonatate (TESSALON) capsule 200 mg  200 mg Oral TID PRN Laxmi Rankinff, DO        calcium elemental (OSCAL) tablet 500 mg  500 mg Oral Daily Cleora Sandhoff, DO   500 mg at 11/27/22 7142    hydrALAZINE (APRESOLINE) tablet 25 mg  25 mg Oral BID Laxmi Kilgorehoff, DO   25 mg at 11/27/22 0837    metoprolol tartrate (LOPRESSOR) tablet 25 mg  25 mg Oral BID Laxmi Kilgorehojaleesa, DO   25 mg at 11/27/22 3010    vitamin D (CHOLECALCIFEROL) tablet 1,000 Units  1,000 Units Oral Daily Laxmi Kilgorehoff, DO   1,000 Units at 11/27/22 9885    oxyCODONE (ROXICODONE) immediate release tablet 5 mg  5 mg Oral Q6H PRN Whitley Blade, DO   5 mg at 11/25/22 0800    allopurinol (ZYLOPRIM) tablet 50 mg  50 mg Oral Daily Whitley Blade, DO   50 mg at 11/27/22 8101    lactated ringers infusion   IntraVENous Continuous [place] Sundeep Sedar,  mL/hr at 11/27/22 0719 New Bag at 11/27/22 0719    predniSONE (DELTASONE) tablet 40 mg  40 mg Oral Daily Sergei D Sedar, DO   40 mg at 11/27/22 0894       PHYSICAL EXAM:    BP (!) 143/62   Pulse 80   Temp 98.2 °F (36.8 °C) (Oral)   Resp 18   Ht 5' 5.98\" (1.676 m)   Wt 220 lb (99.8 kg)   SpO2 96%   BMI 35.53 kg/m²    General Appearance:      Skin:  normal  CVS - Normal sounds, No murmurs , No carotid Bruits  RS -CTA  Abdomen Soft, BS present  Review of Systems   Mental Status Exam:             Level of Alertness:   awake            Orientation:   person, place, time                      Attention/Concentration:  normal            Language:  normal      Funduscopic Exam:     Cranial Nerves            Cranial nerve III           Pupils:  equal, round, reactive to light      Cranial nerves III, IV, VI           Extraocular Movements: intact      Cranial nerve V           Facial sensation:  intact      Cranial nerve VII           Facial strength: intact      Cranial nerve VIII           Hearing:  intact      Cranial nerve IX           Palate:  intact      Cranial nerve XI         Shoulder shrug:  intact      Cranial nerve XII          Tongue movement:  normal    Motor: Foot drop notable on the right though this is secondary to immobility of the ankle and not a true neurogenic foot drop. Swelling is notable and second tenderness Multiple the ankles. The left leg shows improvement since yesterday patient is quite hyperreflexic throughout    Patient swelling is decreasing and she has no movement at her ankle bilaterally.   Right is somewhat restricted but much better            Sensory: No definite sensory levels        Pinprick             Right Upper Extremity:  normal             Left Upper Extremity:  normal             Right Lower Extremity:  normal             Left Lower Extremity:  normal           Vibration                         Touch            Proprioception                 Coordination:           Finger/Nose   Right:  normal              Left:  normal                  Gait:                       Casual: Gait is deferred          Reflexes:             Deep Tendon Reflexes: has improved considerably. Patient is on prednisone. She is still somewhat hyperreflexic but no other ascending paralysis is notable. Inflammatory markers are pending for vasculitis. We will keep an eye on this and continue to follow. Alex Vasquez MD, Clinton Sesay, American Board of Psychiatry & Neurology  Board Certified in Vascular Neurology  Board Certified in Neuromuscular Medicine  Certified in . [address]

## 2022-11-27 NOTE — PROGRESS NOTES
Physical Therapy Med Surg Daily Treatment Note  Facility/Department: [facility]  Room: [facility] [place]-91       NAME: Branden Dozier  : 1960 (58 y.o.)  MRN: [id number]  CODE STATUS: Full Code    Date of Service: 2022    Patient Diagnosis(es): Dehydration [E86.0]  Weakness [R53.1]  Generalized weakness [R53.1]  Paresthesia of foot, bilateral [R20.2]   Chief Complaint   Patient presents with    Illness     Pt has been feeling sick for 3 days (pt having chills, fever, and diarrhea). Pt has gotten out of bed for 3 days      Patient Active Problem List    Diagnosis Date Noted    Paraparesis of both lower limbs ([facility] [address] 2022    Lumbar stenosis with neurogenic claudication 2022    Inflammatory arthritis 2022    Weakness 2022    High cholesterol     Hypertension     Depression     Family history of ischemic heart disease     Palpitations         Past Medical History:   Diagnosis Date    ACE-inhibitor cough     Colon polyp     Depression     Diverticulosis     Family history of ischemic heart disease     High cholesterol     Hypertension     Palpitations     Vaginal dryness      Past Surgical History:   Procedure Laterality Date    BACK SURGERY      disk removal    CARDIAC CATHETERIZATION      Had infusion of wrong fluids with ablation. ..flash pulmonary edema. Valri Dykes on vent x 4 hours in ICU. ..cardiac cath to follow negative    ENDOMETRIAL ABLATION      TONSILLECTOMY      WISDOM TOOTH EXTRACTION         Restrictions  Restrictions/Precautions: Fall Risk    SUBJECTIVE   General  Chart Reviewed: Yes  Family / Caregiver Present: Yes (pt's sister King Fam present)  Subjective  Subjective: Pt denies pain currently. Pt up on commode prior to tx.     Pre-Session Pain Report  none    Post-Session Pain Report  none      OBJECTIVE        Bed mobility  Bed Mobility Comments: NT - pt on commode pre-tx then up to chair post-tx to promote out of bed activity    Transfers  Sit to Stand: Minimal Assistance  Stand to Sit: Contact guard assistance  Comment: VC's for safe hand placement w/ good follow through - pt's sister present on pt's other side for pt comfort, however no physical assist needed    Ambulation  Surface: Level tile  Device: Rolling Walker  Assistance: Contact guard assistance;Minimal assistance  Quality of Gait: decreased b/l foot clearance and step length, heavy UE use on [employer], flexed forward posture  Distance: 3ft from [facility] to chair    Administered/reviewed supine and seated HEP w/ pt verbalizing good understanding    Activity Tolerance  Activity Tolerance: Patient tolerated treatment well;Patient limited by endurance          ASSESSMENT   Assessment: Pt w/ excellent follow through of cues for technique and sequencing w/ transfers and gait. Pt limited by overall endurance, however is motivated to progress. Pt verbalizes good understanding of supine and seated HEP. Therapy Prognosis: Good     Discharge Recommendations:  Continue to assess pending progress, Therapy recommended at discharge    Goals  Short Term Goals  Short Term Goal 1: Pt will be able to complete all transfers with SBA. Short Term Goal 2: Pt will demonstrate Fair+ static and dynamic stnading balance. Short Term Goal 3: Pt will be able to ambulate >/= 48' with LRD with good stabiltiy and foot clearance CGA. Short Term Goal 4: Improve geno LE strength to increase ease with all transfers and mobility. PLAN       Safety Devices  Type of Devices: Call light within reach, Nurse notified (pt's sister yemi present)     [facility] (6 CLICK) [id number] Mobility Raw Score : 13     Therapy Time   Individual   Time In 1000   Time Out 1015   Minutes 15     Trans 10  Therex 5    Phyllistine Semen, [place], 11/27/22 at 10:21 AM         Definitions for assistance levels  Independent = pt does not require any physical supervision or assistance from another person for activity completion. Device may be needed.   Stand by assistance = pt requires verbal cues or instructions from another person, close to but not touching, to perform the activity  Minimal assistance= pt performs 75% or more of the activity; assistance is required to complete the activity  Moderate assistance= pt performs 50% of the activity; assistance is required to complete the activity  Maximal assistance = pt performs 25% of the activity; assistance is required to complete the activity  Dependent = pt requires total physical assistance to accomplish the task

## 2022-11-27 NOTE — PROGRESS NOTES
Hospitalist Progress Note      PCP: Yolande Mondragon MD    Date of Admission: 11/23/2022    Chief Complaint:      Subjective: :  No new symptoms. No fever. Feels weakness is improving. Medications:  Reviewed    Infusion Medications    lactated ringers 125 mL/hr at 11/27/22 0719     Scheduled Medications    atorvastatin  20 mg Oral Nightly    calcium elemental  500 mg Oral Daily    hydrALAZINE  25 mg Oral BID    metoprolol tartrate  25 mg Oral BID    Vitamin D  1,000 Units Oral Daily    allopurinol  50 mg Oral Daily    predniSONE  40 mg Oral Daily     PRN Meds: acetaminophen, benzonatate, oxyCODONE    No intake or output data in the 24 hours ending 11/27/22 1315      Exam:    BP (!) 143/62   Pulse 80   Temp 98.2 °F (36.8 °C) (Oral)   Resp 18   Ht 5' 5.98\" (1.676 m)   Wt 220 lb (99.8 kg)   SpO2 96%   BMI 35.53 kg/m²     Gen: Alert and oriented x3, no acute distress  HEENT: Normocephalic, atraumatic, pupils are equal reactive to light, trachea is midline  RESP: Clear to auscultation bilaterally  Cardio: Normal S1-S2  Abdomen: Soft, nondistended nontender to palpation bowel sounds in 4  Ext: Bilateral tophi great toes, tender to palpation. Bilateral ankle edema and swelling, pain with palpation and restricted range of motion bilaterally due to pain  Skin: Warm and Dry        Labs:   Recent Labs     11/25/22  0516 11/26/22  0512 11/27/22  0511   WBC 12.4* 11.5* 8.9   HGB 10.7* 10.6* 10.7*   HCT 32.8* 31.6* 32.5*    360 424*     Recent Labs     11/25/22  0516 11/26/22  0512 11/27/22  0511    142 142   K 4.7 4.3 4.0   * 106 104   CO2 23 28 26   BUN 22 30* 28*   CREATININE 1.26* 1.36* 1.27*   CALCIUM 9.0 9.1 9.0     No results for input(s): AST, ALT, BILIDIR, BILITOT, ALKPHOS in the last 72 hours. No results for input(s): INR in the last 72 hours. No results for input(s): Olena Bush in the last 72 hours.       Urinalysis:      Lab Results   Component Value Date/Time    NITRU Negative 11/23/2022 03:15 PM    [address] 11/23/2022 03:15 PM    BACTERIA FEW 11/23/2022 03:15 PM    RBCUA 0-2 11/23/2022 03:15 PM    BLOODU MODERATE 11/23/2022 03:15 PM    SPECGRAV 1.022 11/23/2022 03:15 PM    GLUCOSEU Negative 11/23/2022 03:15 PM       Radiology:  US DUP LOWER EXTREMITIES BILATERAL VENOUS   Final Result   No evidence of DVT in either lower extremity. MRI LUMBAR SPINE WO CONTRAST   Final Result   1. Severe spinal canal stenosis and moderate to severe left neural foraminal   narrowing at L3-4 secondary to grade 1 anterolisthesis, disc bulge, facet   arthropathy and thickening of the ligamentum flavum. 2. Moderate spinal canal stenosis and severe right neural foraminal narrowing   at L4-5 secondary to a disc bulge, facet arthropathy and thickening of the   ligamentum flavum. 3. Mild spinal canal stenosis at L1-L2 and L2-L3, as described above. 4. Mild bilateral neural foraminal narrowing at L5-S1         XR ANKLE LEFT (MIN 3 VIEWS)   Final Result   No fracture or dislocation. Minimal plantar heel spur. XR FOOT RIGHT (MIN 3 VIEWS)   Final Result   Marked hallux valgus deformity. XR ANKLE RIGHT (MIN 3 VIEWS)   Final Result   Negative study of the right ankle. XR FOOT LEFT (MIN 3 VIEWS)   Final Result   Hilus valgus deformity no fracture or dislocation         XR CHEST PORTABLE   Final Result   No acute process. Mildly elevated right hemidiaphragm.                  Assessment/Plan:    Active Hospital Problems    Diagnosis Date Noted    Paraparesis of both lower limbs ([facility] [address] [G82.20] 11/26/2022     Priority: Medium    Lumbar stenosis with neurogenic claudication [M48.062] 11/26/2022     Priority: Medium    Inflammatory arthritis [M19.90] 11/26/2022     Priority: Medium    Weakness [R53.1] 11/23/2022     Priority: Medium     B/L LE pain and swelling, Crystallopathy suspected  -Presenting with exquisite pain to the lower extremities bilaterally and inability to ambulate, inflammatory markers are elevated, also with leukocytosis  -Gout suspected, could also be pseudogout, uric acid is 3.6  -Orthopedics consulted-no plan for procedures.  -Continue 40 mg prednisone daily, start renally dosed allopurinol, will hold colchicine until renal function is improved  -DWIGHT and rheumatoid panel ordered-pending  -MRI lumbar ordered by neurology-MRI showed spinal stenosis that is severe, seen by neurosurgeon, recommended surgery but patient is hesitant. UTI  -Resume rocephin   -Noted pansensitive E. coli on the urine studies. Will treat for 3 days. SHLOMO  -Cr 1.6-->1.2, suspect decreased intake at home as patient was reportedly bedbound for several days.  -Continue IV fluid hydration and follow serial BMP    Leukocytosis: Suspect reactive to gout, monitor, if patient has fever obtain blood cultures x2 and panculture      Dispo- may need SNF vs rehab given lower ext weakness     Additional work up or/and treatment plan may be added today or then after based on clinical progression. I am managing a portion of pt care. Some medical issues are handled by other specialists. Additional work up and treatment should be done in out pt setting by pt PCP and other out pt providers. Diet: ADULT DIET;  Regular    PT/OT Eval         Electronically signed by Anastasiya Waite DO on 11/27/2022 at 1:15 PM

## 2022-11-28 PROBLEM — M21.372 BILATERAL FOOT-DROP: Status: ACTIVE | Noted: 2022-11-28

## 2022-11-28 PROBLEM — G83.4 CAUDA EQUINA SYNDROME (HCC): Status: ACTIVE | Noted: 2022-11-28

## 2022-11-28 PROBLEM — I35.1 NONRHEUMATIC AORTIC (VALVE) INSUFFICIENCY: Status: ACTIVE | Noted: 2022-01-24

## 2022-11-28 PROBLEM — I07.1 TRICUSPID VALVE INSUFFICIENCY: Status: ACTIVE | Noted: 2022-11-28

## 2022-11-28 PROBLEM — M21.371 BILATERAL FOOT-DROP: Status: ACTIVE | Noted: 2022-11-28

## 2022-11-28 PROBLEM — I49.3 VENTRICULAR PREMATURE DEPOLARIZATION: Status: ACTIVE | Noted: 2022-01-24

## 2022-11-28 PROBLEM — I42.9 CARDIOMYOPATHY (HCC): Status: ACTIVE | Noted: 2022-11-28

## 2022-11-28 PROBLEM — G47.9 SLEEP DISORDER: Status: ACTIVE | Noted: 2022-11-28

## 2022-11-28 PROBLEM — I07.1 RHEUMATIC TRICUSPID INSUFFICIENCY: Status: ACTIVE | Noted: 2022-01-24

## 2022-11-28 PROBLEM — G43.909 MIGRAINE HEADACHE: Status: ACTIVE | Noted: 2022-11-28

## 2022-11-28 LAB
ANION GAP SERPL CALCULATED.3IONS-SCNC: 11 MEQ/L (ref 9–15)
BASOPHILS ABSOLUTE: 0 K/UL (ref 0–0.2)
BASOPHILS RELATIVE PERCENT: 0.3 %
BUN BLDV-MCNC: 28 MG/DL (ref 8–23)
CALCIUM SERPL-MCNC: 9.6 MG/DL (ref 8.5–9.9)
CARBAMYLATED PROTEIN (CARP) ANTIBODY, IGG: 5 UNITS (ref 0–19)
CCP IGG ANTIBODIES: 22 UNITS (ref 0–19)
CHLORIDE BLD-SCNC: 103 MEQ/L (ref 95–107)
CO2: 25 MEQ/L (ref 20–31)
CREAT SERPL-MCNC: 1.39 MG/DL (ref 0.5–0.9)
EOSINOPHILS ABSOLUTE: 0 K/UL (ref 0–0.7)
EOSINOPHILS RELATIVE PERCENT: 0.2 %
GFR SERPL CREATININE-BSD FRML MDRD: 42.8 ML/MIN/{1.73_M2}
GLUCOSE BLD-MCNC: 80 MG/DL (ref 70–99)
HCT VFR BLD CALC: 36.7 % (ref 37–47)
HEMOGLOBIN: 12.3 G/DL (ref 12–16)
LYMPHOCYTES ABSOLUTE: 1.8 K/UL (ref 1–4.8)
LYMPHOCYTES RELATIVE PERCENT: 19.2 %
MCH RBC QN AUTO: 31.7 PG (ref 27–31.3)
MCHC RBC AUTO-ENTMCNC: 33.4 % (ref 33–37)
MCV RBC AUTO: 95 FL (ref 79.4–94.8)
MONOCYTES ABSOLUTE: 0.5 K/UL (ref 0.2–0.8)
MONOCYTES RELATIVE PERCENT: 5.7 %
NEUTROPHILS ABSOLUTE: 7 K/UL (ref 1.4–6.5)
NEUTROPHILS RELATIVE PERCENT: 74.6 %
PDW BLD-RTO: 13.1 % (ref 11.5–14.5)
PLATELET # BLD: 455 K/UL (ref 130–400)
POTASSIUM SERPL-SCNC: 5 MEQ/L (ref 3.4–4.9)
RBC # BLD: 3.86 M/UL (ref 4.2–5.4)
RHEUMATOID FACTOR: 13 IU/ML (ref 0–14)
SODIUM BLD-SCNC: 139 MEQ/L (ref 135–144)
WBC # BLD: 9.3 K/UL (ref 4.8–10.8)

## 2022-11-28 PROCEDURE — 6370000000 HC RX 637 (ALT 250 FOR IP): Performed by: INTERNAL MEDICINE

## 2022-11-28 PROCEDURE — 97166 OT EVAL MOD COMPLEX 45 MIN: CPT

## 2022-11-28 PROCEDURE — 36415 COLL VENOUS BLD VENIPUNCTURE: CPT

## 2022-11-28 PROCEDURE — 86225 DNA ANTIBODY NATIVE: CPT

## 2022-11-28 PROCEDURE — 80048 BASIC METABOLIC PNL TOTAL CA: CPT

## 2022-11-28 PROCEDURE — 85025 COMPLETE CBC W/AUTO DIFF WBC: CPT

## 2022-11-28 PROCEDURE — 99233 SBSQ HOSP IP/OBS HIGH 50: CPT | Performed by: PSYCHIATRY & NEUROLOGY

## 2022-11-28 PROCEDURE — APPSS30 APP SPLIT SHARED TIME 16-30 MINUTES: Performed by: NURSE PRACTITIONER

## 2022-11-28 PROCEDURE — 1210000000 HC MED SURG R&B

## 2022-11-28 PROCEDURE — 2580000003 HC RX 258: Performed by: INTERNAL MEDICINE

## 2022-11-28 RX ADMIN — SODIUM CHLORIDE, POTASSIUM CHLORIDE, SODIUM LACTATE AND CALCIUM CHLORIDE 1000 ML: 600; 310; 30; 20 INJECTION, SOLUTION INTRAVENOUS at 14:33

## 2022-11-28 RX ADMIN — Medication 500 MG: at 10:07

## 2022-11-28 RX ADMIN — ATORVASTATIN CALCIUM 20 MG: 20 TABLET, FILM COATED ORAL at 20:16

## 2022-11-28 RX ADMIN — METOPROLOL TARTRATE 25 MG: 25 TABLET, FILM COATED ORAL at 20:16

## 2022-11-28 RX ADMIN — ALLOPURINOL 50 MG: 100 TABLET ORAL at 10:07

## 2022-11-28 RX ADMIN — HYDRALAZINE HYDROCHLORIDE 25 MG: 25 TABLET, FILM COATED ORAL at 18:59

## 2022-11-28 RX ADMIN — Medication 1000 UNITS: at 10:05

## 2022-11-28 RX ADMIN — SODIUM CHLORIDE, POTASSIUM CHLORIDE, SODIUM LACTATE AND CALCIUM CHLORIDE: 600; 310; 30; 20 INJECTION, SOLUTION INTRAVENOUS at 23:48

## 2022-11-28 RX ADMIN — LOPERAMIDE HYDROCHLORIDE 2 MG: 2 CAPSULE ORAL at 01:41

## 2022-11-28 RX ADMIN — HYDRALAZINE HYDROCHLORIDE 25 MG: 25 TABLET, FILM COATED ORAL at 10:04

## 2022-11-28 RX ADMIN — METOPROLOL TARTRATE 25 MG: 25 TABLET, FILM COATED ORAL at 10:03

## 2022-11-28 RX ADMIN — PREDNISONE 40 MG: 20 TABLET ORAL at 10:03

## 2022-11-28 RX ADMIN — SODIUM CHLORIDE, POTASSIUM CHLORIDE, SODIUM LACTATE AND CALCIUM CHLORIDE: 600; 310; 30; 20 INJECTION, SOLUTION INTRAVENOUS at 03:28

## 2022-11-28 ASSESSMENT — ENCOUNTER SYMPTOMS
EYE REDNESS: 0
BLOOD IN STOOL: 0
TROUBLE SWALLOWING: 0
EYE PAIN: 0
COLOR CHANGE: 0
ABDOMINAL PAIN: 0
CONSTIPATION: 1
BACK PAIN: 1
COUGH: 0
SORE THROAT: 0
CHEST TIGHTNESS: 0
STRIDOR: 0
WHEEZING: 0
BACK PAIN: 0
SHORTNESS OF BREATH: 1
DIARRHEA: 0
NAUSEA: 0
PHOTOPHOBIA: 0
ABDOMINAL DISTENTION: 0
SHORTNESS OF BREATH: 0
VOMITING: 0

## 2022-11-28 ASSESSMENT — PAIN SCALES - GENERAL
PAINLEVEL_OUTOF10: 0
PAINLEVEL_OUTOF10: 0

## 2022-11-28 NOTE — PROGRESS NOTES
[facility] OCCUPATIONAL THERAPY EVALUATION - ACUTE     NAME: Onur Cooley  : 1960 (58 y.o.)  MRN: [id number]  CODE STATUS: Full Code  Room: H872/K359-67    Date of Service: 2022    Patient Diagnosis(es): Dehydration [E86.0]  Weakness [R53.1]  Generalized weakness [R53.1]  Paresthesia of foot, bilateral [R20.2]   Patient Active Problem List    Diagnosis Date Noted    Cardiomyopathy ([facility] [address] 2022    Migraine headache 2022    Sleep disorder 2022    Tricuspid valve insufficiency 2022    Cauda equina syndrome ([facility] [address] 2022    Bilateral foot-drop 2022    Paraparesis of both lower limbs ([facility] [address] 2022    Lumbar stenosis with neurogenic claudication 2022    Inflammatory arthritis 2022    Weakness 2022    Nonrheumatic aortic (valve) insufficiency 2022    Rheumatic tricuspid insufficiency 2022    Ventricular premature depolarization 2022    High cholesterol     Hypertension     Depression     Family history of ischemic heart disease     Palpitations         Past Medical History:   Diagnosis Date    ACE-inhibitor cough     Colon polyp     Depression     Diverticulosis     Family history of ischemic heart disease     High cholesterol     Hypertension     Palpitations     Vaginal dryness      Past Surgical History:   Procedure Laterality Date    BACK SURGERY      disk removal    CARDIAC CATHETERIZATION      Had infusion of wrong fluids with ablation. ..flash pulmonary edema. Farrar Colon on vent x 4 hours in ICU. ..cardiac cath to follow negative    ENDOMETRIAL ABLATION      TONSILLECTOMY      WISDOM TOOTH EXTRACTION          Restrictions  Restrictions/Precautions: Fall Risk       Safety Devices: Safety Devices  Type of Devices: All fall risk precautions in place; Bed alarm in place; Left in bed;Call light within reach     Patient's date of birth confirmed: Yes    General:  Chart Reviewed: Yes  Patient assessed for rehabilitation services?: Yes    Subjective  Subjective: \"I haven't really been able to do things myself for a month\"       Pain at start of treatment: No    Pain at end of treatment: No    Location:   Nursing notified: Not Applicable  RN:   Intervention: Repositioned    Prior Level of Function:  Social/Functional History  Lives With:  (Roommate)  Type of Home:  (Duplex)  Home Layout: One level  Home Access: Stairs to enter with rails  Entrance Stairs - Number of Steps: 4  Entrance Stairs - Rails: Both  Bathroom Shower/Tub: Tub/Shower unit  Bathroom Equipment: Shower chair, Hand-held shower  Home Equipment: Nanetta An  Has the patient had two or more falls in the past year or any fall with injury in the past year?: No  ADL Assistance: Independent  Homemaking Assistance: Independent  Ambulation Assistance: Independent  Transfer Assistance: Independent  Active : Yes  Occupation: Full time employment  Type of Occupation:     OBJECTIVE:     Orientation Status:  Orientation  Overall Orientation Status: Within Normal Limits    Observation:  Observation/Palpation  Posture: Good  Observation: Pt alert and attentive, agreeable to OT eval, BLE edema    Cognition Status:  Cognition  Overall Cognitive Status: WFL    Perception Status:  Perception  Overall Perceptual Status: [facility]    Vision and Hearing Status:  Vision  Vision Exceptions: Wears glasses at all times  Hearing  Hearing: Within functional limits   Vision - Basic Assessment  Prior Vision: Wears glasses all the time  Visual History: No significant visual history  Patient Visual Report: No visual complaint reported.   Visual Field Cut: No    GROSS ASSESSMENT AROM/PROM:  AROM: Within functional limits       ROM:   LUE AROM (degrees)  LUE AROM : WFL  Left Hand AROM (degrees)  Left Hand AROM: WFL  RUE AROM (degrees)  RUE AROM : WFL  Right Hand AROM (degrees)  Right Hand AROM: WFL    UE STRENGTH:  Strength: Generally decreased, functional    UE COORDINATION:  Coordination: Generally decreased, functional    UE TONE:  Tone: Normal    UE SENSATION:  Sensation: Intact    Hand Dominance:  Hand Dominance  Hand Dominance: Left    ADL Status:  ADL  Feeding: Independent  Grooming: Supervision  UE Bathing: Setup  LE Bathing: Minimal assistance  UE Dressing: Setup  LE Dressing: Moderate assistance  Toileting: Contact guard assistance  Additional Comments: Simulated ADLs as above, limited d/t fatigue and weakness, difficulty with standing balance as well as decreased maintenance of figure 4 at [employer] - Technique: Stand step  Equipment Used: Standard bedside commode  Toilet Transfer: Contact guard assistance  Toilet Transfers Comments: Increased effort    Functional Mobility:    Transfers  Sit to stand: Contact guard assistance  Stand to sit: Stand by assistance    Patient ambulated pivot only to bed from [facility]  with [employer] at SBA level.  Increased effort for navigating turns    Bed Mobility  Bed mobility  Supine to Sit: Unable to assess  Sit to Supine: Stand by assistance  Bed Mobility Comments: HOB elevated, increased time, up from [address] [place] [address] and Standing Balance:  Balance  Sitting: Intact  Standing: Impaired  Standing - Static: Good  Standing - Dynamic: Fair    Functional Endurance:  Activity Tolerance  Activity Tolerance: Patient Tolerated treatment well    D/C Recommendations:  OT D/C RECOMMENDATIONS  REQUIRES OT FOLLOW-UP: Yes    Equipment Recommendations:  OT Equipment Recommendations  Other: Continue to assess    OT Education:   Patient Education  Education Given To: Patient  Education Provided: Role of Therapy;Plan of Care  Education Method: Verbal  Barriers to Learning: None  Education Outcome: Verbalized understanding    OT Follow Up:   OT D/C RECOMMENDATIONS  REQUIRES OT FOLLOW-UP: Yes       Assessment/Discharge Disposition:  Assessment: Pt is a 58year old woman from home who presents to [facility] with the above deficits which impact her ability to perform ADLs and IADLs. Pt. limited d/t fatigue and weakness. Pt would benefit from continued OT to maximize independence and safety with ADL tasks.   Performance deficits / Impairments: Decreased ADL status, Decreased functional mobility , Decreased strength, Decreased endurance, Decreased balance, Decreased high-level IADLs  Prognosis: Good  Discharge Recommendations: Continue to assess pending progress  Decision Making: Medium Complexity  History: Pt's medical history is moderately complex  Exam: Pt. has 6 performance deficits  Assistance / Modification: Pt. requires min A    AMPAC (Six Click) Self care Score   How much help for putting on and taking off regular lower body clothing?: A Lot  How much help for Bathing?: A Lot  How much help for Toileting?: A Little  How much help for putting on and taking off regular upper body clothing?: A Little  How much help for taking care of personal grooming?: A Little  How much help for eating meals?: None  -[facility] Inpatient Daily Activity Raw Score: 17  AM-PAC Inpatient ADL T-Scale Score : 37.26  ADL Inpatient CMS 0-100% Score: 50.11    Therapy key for assistance levels -   Independent/Mod I = Pt. is able to perform task with no assistance but may require a device   Stand by assistance = Pt. does not perform task at an independent level but does not need physical assistance, requires verbal cues  Minimal, Moderate, Maximal Assistance = Pt. requires physical assistance (25%, 50%, 75% assist from helper) for task but is able to actively participate in task   Dependent = Pt. requires total assistance with task and is not able to actively participate with task completion     Plan:  Occupational Therapy Plan  Times Per Week: 1-4x  Therapy Duration:  (Length of acute stay)  Current Treatment Recommendations: Strengthening, Functional mobility training, Endurance training, Balance training, Pain management, Safety education & training, Patient/Caregiver education & training, Equipment evaluation, education, & procurement, Self-Care / ADL    Goals:   Patient will:    - Improve functional endurance to tolerate/complete 30 mins of ADL's  - Be Mod I in UB ADLs   - Be Mod I in LB ADLs  - Be Mod I in ADL transfers without LOB  - Be Mod I in toileting tasks  - Improve B UE strength and endurance to 4/5 in order to participate in self-care activities as projected. - Access appropriate D/C site with as few architectural barriers as possible. - Sequence self-care tasks with no verbal cues for safety    Patient Goal: Patient goals : \"I want to get home\"     Discussed and agreed upon: Yes Comments:       Therapy Time:   Individual   Time In 1542   Time Out 1553   Minutes 11      Eval: 11 minutes     Electronically signed by:     MELODY Cheng/L,   11/28/2022, 4:17 PM

## 2022-11-28 NOTE — CONSULTS
Physical Medicine & Rehabilitation  Consult Note      Admitting Physician: Harpal Almonte MD    Primary Care Provider: Stephane Coburn MD     Reason for Consult:  Asses rehab needs, promote physical and mental function, analyze level of care to determine rehab needs, improve ability to actively participate in the rehabilitation process, and decrease likelihood of re-admit to the hospital after discharge. History of Present Illness:    Boni Jackson is a 58 y.o. female admitted to [facility] on 11/23/2022. Pt admitted with malaise and BBLE foot drop-noticed with cauda equina syndrome. She is awaiting medical stability and then possible decompression by Dr. Regan Luque. Back Pain  This is a chronic problem. The current episode started more than 1 year ago. The problem occurs constantly. The pain is present in the lumbar spine. The pain is at a severity of 1/10. The pain is mild. The pain is Worse during the day. The symptoms are aggravated by bending. Associated symptoms include paresis and weakness. Pertinent negatives include no abdominal pain, chest pain, dysuria, fever or headaches. Risk factors include sedentary lifestyle, lack of exercise, obesity and menopause. She has tried heat and analgesics for the symptoms. The treatment provided mild relief. I reviewed recent nursing notes discussed care with acute care providers, \" Inpatient Rehab referral received. Met with patient and explained [facility] Acute Inpatient Rehab program and requirements, including 3 hours of intense therapy daily, anticipated length of stay and goal of discharge to home. All questions answered and patient verbalized understanding. Freedom of choice provided and patient requests admit to [facility] for strengthening before returning home. Per Neurosurgery notes 11/26: Plan surgery after medical assessment.  Patient states they decided to wait until her BLE swelling has resolved and then added that she is not ready for surgery. Nadine C3 to clarify. \".   Events from the previous 24 hours reviewed    . Their inpatient work up has included:    Imaging:  Imaging and other studies reviewed and discussed with patient and staff    XR ANKLE LEFT   11/25/2022    There is no fracture or dislocation. No soft tissue swelling identified. The ankle mortise is unremarkable. There is minimal plantar heel spur. No fracture or dislocation. Minimal plantar heel spur. XR ANKLE RIGHT   11/25/2022  There is no fracture or dislocation. No soft tissue swelling identified. The ankle mortise and subtalar joint are unremarkable. Negative study of the right ankle. XR FOOT LEFT  11/25/2022   There is moderate hallux valgus deformity. A fracture or dislocation is not seen. There is minimal soft tissue swelling adjacent to the 5th metatarsophalangeal joint. Minimal plantar heel spur present      Hallux valgus deformity no fracture or dislocation         XR FOOT RIGHT 11/25/2022  There is marked hallux valgus deformity but no fracture or dislocation seen. Minimal soft tissue swelling dorsal aspect of the right foot. Marked hallux valgus deformity. MRI LUMBAR SPINE   11/25/2022   BONES/ALIGNMENT: There is grade 1 anterolisthesis of L3 relative to L4 measuring 2 mm. Alignment is otherwise normal.  There is no acute fracture. There is no spondylolysis. There is disc desiccation from L3-4 to L5-S1. Severe disc space narrowing is present at L5-S1. Bone marrow signal intensity is normal.   SPINAL CORD: The conus medullaris is normal in size and signal intensities and terminates normally. SOFT TISSUES: No paraspinal mass is identified. L1-L2: There is a disc bulge and facet arthropathy. There is mild spinal canal stenosis. No significant neural foraminal narrowing is present. L2-L3: There is a disc bulge, facet arthropathy and thickening of the ligamentum flavum. There is mild spinal canal stenosis. No significant neural foraminal narrowing is present. L3-L4: There is grade 1 anterolisthesis. There is a disc bulge, facet arthropathy and thickening of the ligamentum flavum. There is severe spinal canal stenosis and moderate to severe left neural foraminal narrowing. No significant right neural foraminal narrowing is present. L4-L5: There is a disc bulge, facet arthropathy and thickening of the ligamentum flavum. There is moderate spinal canal stenosis and severe right neural foraminal narrowing. No significant left neural foraminal narrowing is present. L5-S1: The sequelae of left hemilaminectomy is noted. There is a disc bulge and posterior osteophyte formation resulting in mild bilateral neural foraminal narrowing. No significant spinal canal stenosis is present. 1. Severe spinal canal stenosis and moderate to severe left neural foraminal narrowing at L3-4 secondary to grade 1 anterolisthesis, disc bulge, facet arthropathy and thickening of the ligamentum flavum. 2. Moderate spinal canal stenosis and severe right neural foraminal narrowing at L4-5 secondary to a disc bulge, facet arthropathy and thickening of the ligamentum flavum. 3. Mild spinal canal stenosis at L1-L2 and L2-L3, as described above. 4. Mild bilateral neural foraminal narrowing at L5-S1     XR CHEST   11/23/2022  The lungs are without acute focal process. There is no effusion or pneumothorax. The cardiomediastinal silhouette is without acute process. The osseous structures are without acute process. No acute process. Mildly elevated right hemidiaphragm. US DUP LOWER EXTREMITIES BILATERAL VENOUS  11/25/2022   The visualized veins of the bilateral lower extremities are patent and free of echogenic thrombus. The veins demonstrate good compressibility with normal color flow study and spectral analysis. No evidence of DVT in either lower extremity.               Labs:    Labs reviewed and discussed with patient and staff    Lab Results   Component Value Date/Time    POCGLU 93 11/23/2022 08:52 PM     Lab Results   Component Value Date/Time     11/29/2022 05:26 AM    K 3.9 11/29/2022 05:26 AM     11/29/2022 05:26 AM    CO2 25 11/29/2022 05:26 AM    BUN 25 11/29/2022 05:26 AM    CREATININE 1.44 11/29/2022 05:26 AM    CALCIUM 9.0 11/29/2022 05:26 AM    LABALBU 2.9 11/23/2022 09:49 PM    BILITOT 0.5 11/23/2022 09:49 PM    ALKPHOS 86 11/23/2022 09:49 PM    AST 14 11/23/2022 09:49 PM    ALT 11 11/23/2022 09:49 PM     Lab Results   Component Value Date/Time    WBC 9.7 11/29/2022 05:26 AM    RBC 3.47 11/29/2022 05:26 AM    RBC 4.05 03/23/2012 07:04 AM    HGB 11.0 11/29/2022 05:26 AM    HCT 32.6 11/29/2022 05:26 AM    MCV 93.9 11/29/2022 05:26 AM    MCH 31.6 11/29/2022 05:26 AM    MCHC 33.7 11/29/2022 05:26 AM    RDW 13.2 11/29/2022 05:26 AM     11/29/2022 05:26 AM    MPV 7.8 02/01/2014 10:44 AM     No results found for: VITD25  Lab Results   Component Value Date/Time    COLORU Yellow 11/23/2022 03:15 PM    NITRU Negative 11/23/2022 03:15 PM    GLUCOSEU Negative 11/23/2022 03:15 PM    KETUA 40 11/23/2022 03:15 PM    UROBILINOGEN 1.0 11/23/2022 03:15 PM    BILIRUBINUR Negative 11/23/2022 03:15 PM     No results found for: PROTIME  No results found for: INR      I discussed results with patient.     Current Rehabilitation Assessments:    Rehabilitation:  Physical Therapy  Bed mobility:  Bed mobility  Supine to Sit: Stand by assistance (11/26/22 1318)  Sit to Supine: Stand by assistance (11/26/22 1318)  Bed Mobility Comments: NT - pt on commode pre-tx then up to chair post-tx to promote out of bed activity (11/27/22 1015)  Bed Mobility Training  Bed Mobility Training: No (11/29/22 0922)  Transfers:  Transfers  Sit to Stand: Minimal Assistance (11/27/22 1015)  Stand to Sit: Contact guard assistance (11/27/22 1015)  Comment: VC's for safe hand placement w/ good follow through - pt's sister present on pt's other side for pt comfort, however no physical assist needed (11/27/22 1015)  Transfer Training  Transfer Training: Yes (11/29/22 0935)  Overall Level of Assistance: Stand-by assistance;Contact-guard assistance (11/29/22 0935)  Interventions: Safety awareness training; Tactile cues (11/29/22 0935)  Sit to Stand: Stand-by assistance;Contact-guard assistance (11/29/22 0935)  Stand to Sit: Stand-by assistance;Contact-guard assistance (11/29/22 0935)  Stand Pivot Transfers: Minimum assistance (11/29/22 0935)  Bed to Chair: Minimum assistance (11/29/22 0935)  Gait:   Ambulation  Surface: Level tile (11/27/22 1016)  Device: Rolling Walker (11/27/22 1016)  Assistance: Contact guard assistance;Minimal assistance (11/27/22 1016)  Quality of Gait: decreased b/l foot clearance and step length, heavy UE use on [employer], flexed forward posture (11/27/22 1016)  Distance: 3ft from [facility] to chair (11/27/22 1016)  Gait Training: Yes (11/29/22 0935)  Overall Level of Assistance: Contact-guard assistance (11/29/22 0935)  Distance (ft): 5 Feet (11/29/22 0935)  Assistive Device: [place], rolling (11/29/22 0935)  Interventions: Safety awareness training; Tactile cues (11/29/22 0935)  Base of Support: Widened (11/29/22 0935)  Speed/Elyse: Delayed (11/29/22 0935)  Step Length: Right shortened (11/29/22 0935)  Gait Abnormalities: Step to gait (11/29/22 0935)  Right Side Weight Bearing: As tolerated (11/29/22 0935)  Left Side Weight Bearing: As tolerated (11/29/22 0935)  Stairs:     W/C mobility:         Occupational therapy:   Hand Dominance: Left  ADL  Feeding: Independent (11/28/22 1610)  Grooming: Supervision (11/28/22 1610)  UE Bathing: Setup (11/28/22 1610)  LE Bathing: Minimal assistance (11/28/22 1610)  UE Dressing: Setup (11/28/22 1610)  LE Dressing:  Moderate assistance (11/28/22 1610)  Toileting: Contact guard assistance (11/28/22 1610)  Additional Comments: Simulated ADLs as above, limited d/t fatigue and weakness, difficulty with standing balance as well as decreased maintenance of figure 4 at EOB (11/28/22 1610)  Toilet Transfers  Toilet - Technique: Stand step (11/28/22 1611)  Equipment Used: Standard bedside commode (11/28/22 1611)  Toilet Transfer: Contact guard assistance (11/28/22 1611)  Toilet Transfers Comments: Increased effort (11/28/22 1611)            Speech therapy:            Diet/Swallow:                         COGNITION  OT:    SP: Re-evals pending      Past Medical History:        Diagnosis Date    ACE-inhibitor cough     Colon polyp     Depression     Diverticulosis     Family history of ischemic heart disease     High cholesterol     Hypertension     Palpitations     Vaginal dryness          PastSurgical History:        Procedure Laterality Date    BACK SURGERY      disk removal    CARDIAC CATHETERIZATION      Had infusion of wrong fluids with ablation. ..flash pulmonary edema. Nithin Gonzalez on vent x 4 hours in ICU. ..cardiac cath to follow negative    ENDOMETRIAL ABLATION      TONSILLECTOMY      WISDOM TOOTH EXTRACTION           Allergies:     Allergies   Allergen Reactions    Ace Inhibitors Other (See Comments)     Cough            CurrentMedications:   Current Facility-Administered Medications: loperamide (IMODIUM) capsule 2 mg, 2 mg, Oral, 4x Daily PRN  acetaminophen (TYLENOL) tablet 650 mg, 650 mg, Oral, Q4H PRN  atorvastatin (LIPITOR) tablet 20 mg, 20 mg, Oral, Nightly  benzonatate (TESSALON) capsule 200 mg, 200 mg, Oral, TID PRN  calcium elemental (OSCAL) tablet 500 mg, 500 mg, Oral, Daily  hydrALAZINE (APRESOLINE) tablet 25 mg, 25 mg, Oral, BID  metoprolol tartrate (LOPRESSOR) tablet 25 mg, 25 mg, Oral, BID  vitamin D (CHOLECALCIFEROL) tablet 1,000 Units, 1,000 Units, Oral, Daily  oxyCODONE (ROXICODONE) immediate release tablet 5 mg, 5 mg, Oral, Q6H PRN  allopurinol (ZYLOPRIM) tablet 50 mg, 50 mg, Oral, Daily  lactated ringers infusion, , IntraVENous, Continuous  predniSONE (DELTASONE) tablet 40 mg, 40 mg, Oral, Daily      Social History:  Social History     Socioeconomic History    Marital status:      Spouse name: Not on file    Number of children: Not on file    Years of education: Not on file    Highest education level: Not on file   Occupational History    Occupation: accounts payable   Tobacco Use    Smoking status: Never    Smokeless tobacco: Never   Substance and Sexual Activity    Alcohol use: Yes     Alcohol/week: 0.0 standard drinks     Comment: social    Drug use: No    Sexual activity: Not Currently     Partners: Male   Other Topics Concern    Not on file   Social History Narrative    , Lives With:  (Roommate) sister Melonie    Type of Home:  (Duplex) in [address] [place] [address] St: One level    Home Access: Stairs to enter with rails    Entrance Stairs - Number of Steps: 4    Entrance Stairs - Rails: Both    Home Equipment: Brennan Ponto, Crutches    ADL Assistance: Independent    Homemaking Assistance: Independent    Ambulation Assistance: Independent    Transfer Assistance: Independent    Active : Yes    Occupation: Full time employment    Type of Occupation:          Social Determinants of Health     Financial Resource Strain: Low Risk     Difficulty of Paying Living Expenses: Not hard at all   Food Insecurity: No Food Insecurity    Worried About [address] in the Last Year: Never true    [address] in the Last Year: Never true   Transportation Needs: No Transportation Needs    Lack of Transportation (Medical): No    Lack of Transportation (Non-Medical): No   Physical Activity: Not on file   Stress: Not on file   Social Connections: Not on file   Intimate Partner Violence: Not on file   Housing Stability: Not on file        This history was obtained from family and caregivers and discussed to confirm.       Family History:       Problem Relation Age of Onset    High Blood Pressure Mother     Cancer Father         skin, pancratic, espogas    Heart Disease Father     Cancer Maternal Grandmother         breast    High Blood Pressure Maternal Grandmother     High Blood Pressure Brother     Diabetes Other        Review of Systems:  Review of Systems   Constitutional:  Positive for activity change and fatigue. Negative for chills, diaphoresis and fever. HENT:  Negative for congestion, ear discharge, ear pain, hearing loss, nosebleeds, sore throat and tinnitus. Eyes:  Negative for photophobia, pain and redness. Respiratory:  Positive for shortness of breath. Negative for cough, wheezing and stridor. Shortness of breath on exertion   Cardiovascular:  Negative for chest pain, palpitations and leg swelling. Gastrointestinal:  Positive for constipation. Negative for abdominal pain, blood in stool, diarrhea, nausea and vomiting. Endocrine: Negative for polydipsia. Genitourinary:  Negative for dysuria, flank pain, frequency, hematuria and urgency. Musculoskeletal:  Positive for back pain, gait problem and myalgias. Negative for neck pain. Skin:  Negative for rash. Allergic/Immunologic: Positive for immunocompromised state. Negative for environmental allergies. Neurological:  Positive for weakness. Negative for dizziness, tremors, seizures and headaches. Hematological:  Does not bruise/bleed easily. Psychiatric/Behavioral:  Positive for decreased concentration and dysphoric mood. Negative for hallucinations and suicidal ideas. The patient is not nervous/anxious. Physical Exam:  /79   Pulse 85   Temp 97.5 °F (36.4 °C) (Axillary)   Resp 18   Ht 5' 5.98\" (1.676 m)   Wt 220 lb (99.8 kg)   SpO2 100%   BMI 35.53 kg/m²      Physical Exam  Vitals and nursing note reviewed. Constitutional:       Appearance: Normal appearance. HENT:      Head: Normocephalic and atraumatic. Right Ear: External ear normal.      Left Ear: External ear normal.      Nose: Nose normal.      Mouth/Throat:      Pharynx: Oropharynx is clear.    Eyes:      Extraocular Movements: Extraocular movements intact. Pupils: Pupils are equal, round, and reactive to light. Cardiovascular:      Rate and Rhythm: Normal rate and regular rhythm. Pulses: Normal pulses. Heart sounds: No murmur heard. Pulmonary:      Effort: Pulmonary effort is normal.      Breath sounds: Normal breath sounds. Abdominal:      General: Bowel sounds are normal.      Palpations: Abdomen is soft. Genitourinary:     Rectum: Normal.   Musculoskeletal:         General: Normal range of motion. Cervical back: Normal range of motion. Comments: Mild swelling both lower extremities secondary to inability to move feet. Skin:     General: Skin is warm. Neurological:      General: No focal deficit present. Mental Status: She is alert and oriented to person, place, and time. Cranial Nerves: No cranial nerve deficit. Sensory: No sensory deficit. Motor: Weakness present. No abnormal muscle tone. Coordination: Coordination normal.      Gait: Gait abnormal.      Deep Tendon Reflexes: Reflexes are normal and symmetric. Babinski sign absent on the right side. Babinski sign absent on the left side. Comments: Patient is almost fixed ankles right more than left with some degree of dorsiflexion plantarflexion considerable swelling in the ankles and foot with pedal edema. Dorsalis pedis pulse was not palpable but no color changes are notable in the lower extremity. Reflexes are difficult to obtain at the ankles with 1+ reflex at the knees and had upper extremity strength and reflexes are normal.  Not definite sensory levels are noted. Patient is not ambulatory due to the pain   Psychiatric:         Mood and Affect: Mood normal.     Ortho Exam  Neurologic Exam     Mental Status   Oriented to person, place, and time. Level of consciousness: alert  Knowledge: good.      Cranial Nerves     CN III, IV, VI   Pupils are equal, round, and reactive to light.          Diagnostics:    Recent Results (from the past 24 hour(s))   CBC with Auto Differential    Collection Time: 11/29/22  5:26 AM   Result Value Ref Range    WBC 9.7 4.8 - 10.8 K/uL    RBC 3.47 (L) 4.20 - 5.40 M/uL    Hemoglobin 11.0 (L) 12.0 - 16.0 g/dL    Hematocrit 32.6 (L) 37.0 - 47.0 %    MCV 93.9 79.4 - 94.8 fL    MCH 31.6 (H) 27.0 - 31.3 pg    MCHC 33.7 33.0 - 37.0 %    RDW 13.2 11.5 - 14.5 %    Platelets 896 (H) 684 - 400 K/uL    Neutrophils % 75.9 %    Lymphocytes % 17.8 %    Monocytes % 5.9 %    Eosinophils % 0.2 %    Basophils % 0.2 %    Neutrophils Absolute 7.3 (H) 1.4 - 6.5 K/uL    Lymphocytes Absolute 1.7 1.0 - 4.8 K/uL    Monocytes Absolute 0.6 0.2 - 0.8 K/uL    Eosinophils Absolute 0.0 0.0 - 0.7 K/uL    Basophils Absolute 0.0 0.0 - 0.2 K/uL   Basic Metabolic Panel    Collection Time: 11/29/22  5:26 AM   Result Value Ref Range    Sodium 142 135 - 144 mEq/L    Potassium 3.9 3.4 - 4.9 mEq/L    Chloride 104 95 - 107 mEq/L    CO2 25 20 - 31 mEq/L    Anion Gap 13 9 - 15 mEq/L    Glucose 77 70 - 99 mg/dL    BUN 25 (H) 8 - 23 mg/dL    Creatinine 1.44 (H) 0.50 - 0.90 mg/dL    Est, Glom Filt Rate 41.0 (L) >60    Calcium 9.0 8.5 - 9.9 mg/dL              Impression:    Impaired mobility and ADLs due to cauda equina syndrome- Severe spinal canal stenosis and moderate to severe left neural foraminal narrowing at L3-4 secondary to grade 1 anterolisthesis, disc bulge, facet arthropathy and thickening of the ligamentum flavum. Factors favoring recovery include:  near independent premorbid function        Complex Active General Medical Issues that complicate care and require daily medical supervision: 1. Principal Problem:    Lumbar stenosis with neurogenic claudication  Active Problems:    Weakness    Paraparesis of both lower limbs (HCC)    Inflammatory arthritis    Cardiomyopathy ([facility] [address]    Sleep disorder    Cauda equina syndrome (HCC)    Bilateral foot-drop    Hypertension    Depression  Resolved Problems:    * No resolved hospital problems. *            Recommendations:    Considering all of the factors above including the patient's current medical status, social status/home environment, their functional needs, and their ability to participate in a therapy program, I feel that they would best be served at:    acute intensive comprehensive inpatient rehabilitation program.  Due to the diagnoses above the patient has had significant decline in function and is now requiring acute intensive therapy to optimize function. They are expected to achieve meaningful functional gains. Due to medical complexity as above, rehabilitation physician services is reasonable and necessary including face-to-face visits at least 3 days/week with anticipated need to treat, manage and modify the rehabilitation course of treatment including interdisciplinary team conferences. They will require rehab physician care to monitor for neurogenic bowel bladder, postoperative pain, titration of opiate and high risk medications,   blood pressure and blood sugar control. It is my opinion that they will be able to tolerate and benefit from 3 hours of therapy a day. I reviewed the various options re: levels of care with the patient and family. Please see pre-admission screen note for further details. I discussed acute rehab with the patient and verify that the patient is able and willing to participate in 3 hours of therapy a day. Rehab and Acute Care Case Management has also reinforced this expectation. This patient requires multidisciplinary rehabilitation treatment, including daily care and management from a PM&R physician, 24-hour rehabilitation nursing, Physical Therapy, Occupational Therapy, rehabilitation psychology, consideration of speech and language pathology, recreational therapy, nutritional services, and a rehabilitation .   I feel that it is reasonable to plan for a discharge to home setting after acute rehab.         Specialized nursing care to focus on: Bowel and bladder issues-Monitor for urinary retention-check PVRs, bladder scan--cath if no void. Wound management re wound prevention-pressure relief protocols-side to side turns  IV medication administration IV fluids as needed    Monitor endurance and if necessary spread therapy out over a 7-day window-adding scheduled rest breaks when needed. Focus on energy conservation. Monitor heart rate and   cardiac medications effects on heart rate and blood pressure before, during and after therapy. Progress toward endurance training with pulse ox monitoring for saturation and heart rate. continue to monitor closely for dehydration-- Improve hydration and nutrition by adding Vitamin B12 shot times one, adding Protein supplements and push PO fluids. Treat and monitor for higher level cognitive deficits, focus on difficulty with sequencing and problem-solving. Focus on higher-level balance and falls risk issues focusing on balance training and monitoring for orthostasis. Above recommendations are indicated to address medical complexity and need for appropriate rehab services. Will tailor individual care and rehab plan per individuals needs re monitoring bowel and bladder function. Focus of today's plan-   reassess neurosurgical plans      Required Certification Data (potential inpatient rehabilitation facility patient's only)    Deficits:weakness, mobility, impaired gait, high risk for falls, balance, and ADL's    Disability:mobility, locomotion, and bowel/ bladder status    Potential barriers to progress/discharge:complex medical conditions and bowel/bladder dysfunction         It was my pleasure to evaluate Rachel Manning today. Please call [phone] with questions.     Mercedes Bal,

## 2022-11-28 NOTE — CARE COORDINATION
Inpatient Rehab referral received. Met with patient and explained [facility] Acute Inpatient Rehab program and requirements, including 3 hours of intense therapy daily, anticipated length of stay and goal of discharge to home. All questions answered and patient verbalized understanding. Freedom of choice provided and patient requests admit to [facility] for strengthening before returning home. Per Neurosurgery notes 11/26: Plan surgery after medical assessment. Patient states they decided to wait until her BLE swelling has resolved and then added that she is not ready for surgery. Nadien C3 to clarify. PM&R consult pending and awaiting OT eval. Precert would be needed from [facility] if appropriate.  Electronically signed by Arielle Degroot RN on 11/28/22 at 3:09 PM EST

## 2022-11-28 NOTE — CARE COORDINATION
[facility] Pre-Admission Screening Document      Patient Name: Andrew Hansen       MRN: [id number]    : 1960    Age: 58 y.o. Gender: female   Payor: Payor: Ana [place] / Plan: [address] [place] [address] / Product Type: *No Product type* /   MSSP: No    Admitted from: [facility] Floor: 2W  Attending Care Provider: Makenzie Glover MD  Inpatient Rehab Referring Care Provider: Makenzie Glover MD   Primary Care Provider: Dat Silva MD  Inpatient Treatment Team including Consults: Treatment Team: Attending Provider: Makenzie Glover MD; Consulting Physician: Sharon Aquino MD; Utilization Reviewer: Betina Ramirez RN; Consulting Physician: Nestor Stewart DO; Consulting Physician: Nubia Snell DO; Registered Nurse: Gaile Spatz, RN; Patient Care Tech: Master Route; : Nereyda Davis RN; Registered Nurse: Lesly Pace RN    Reason for Hospitalization:   1. Dehydration    2. Generalized weakness    3. Paresthesia of foot, bilateral      Chief Complaint   Patient presents with    Illness     Pt has been feeling sick for 3 days (pt having chills, fever, and diarrhea). Pt has gotten out of bed for 3 days      Isolation:No active isolations    Hospital Course:  Admit Date: 2022  2:53 PM  Inpatient Rehab Referral Date: 22  Narrative of hospital course/history of present illness: 58 yr old female presented to Ed with c/o generally not feeling well for the last 3 days with chills, fevers, diarrhea, decreased appetite. Patient also notes pain and paresthesias to bilateral feet for 5 days. Orthopedics: weakness in both lower extremities, neuropathy from the mid ankle calf down bilaterally. May need AFO bracing and/or walking boots, but currently too painful with feet slightly plantar flexed. Neurology: Polyarthralgia, Uric acid 8.5. Anti DNA antibody neg. Prednisone.      Neurosurgery: Severe L3-4 canal stenosis with cauda equina compression, correlates with her paraparesis. Acute inflammatory bilateral ankle arthropathy with associated paraparesis right greater than left with profound 1/5 foot drop. Patient may or may not be a surgical candidate in the future. No surgical intervention will be done this admission. Nephrology: CKD-IIIA, Ultrasound smaller than usual kidney size. Follow up in 2 weeks         Medical & Surgical History/Current Comorbidities:  Past Medical History:   Diagnosis Date    ACE-inhibitor cough     Colon polyp     Depression     Diverticulosis     Family history of ischemic heart disease     High cholesterol     Hypertension     Palpitations     Vaginal dryness      Past Surgical History:   Procedure Laterality Date    BACK SURGERY      disk removal    CARDIAC CATHETERIZATION      Had infusion of wrong fluids with ablation. ..flash pulmonary edema. Mariana Grindstone on vent x 4 hours in ICU. ..cardiac cath to follow negative    ENDOMETRIAL ABLATION      TONSILLECTOMY      WISDOM TOOTH EXTRACTION         Advanced Directives:  Advance Directives       Power of  Living Will ACP-Advance Directive ACP-Power of     Not on File Not on File Not on File Not on File            Labs/Infection Control:  Recent Labs     11/29/22  0526 11/30/22  0508   WBC 9.7 10.4   HGB 11.0* 11.3*   HCT 32.6* 33.4*   * 510*   BUN 25* 25*   CREATININE 1.44* 1.70*   GLUCOSE 77 81    144   K 3.9 3.8   CALCIUM 9.0 9.0      Blood cultures:  No results for input(s): BC in the last 72 hours. Urinalysis/C&S:  No results for input(s): NITRITE, LABCAST, WBCUA, RBCUA, MUCUS, TRICHOMONAS, YEAST, BACTERIA, LEUKOCYTESUR, BLOODU, GLUCOSEU, KETUA, AMORPHOUS, LABURIN in the last 72 hours. Radiology:  US RETROPERITONEAL LIMITED  Result Date: 11/30/2022  Kidneys are least questionably atrophic in size without acute findings. No hydronephrosis     XR ANKLE LEFT (MIN 3 VIEWS)  Result Date: 11/25/2022  No fracture or dislocation. Minimal plantar heel spur. XR ANKLE RIGHT (MIN 3 VIEWS)  Result Date: 11/25/2022  Negative study of the right ankle. XR FOOT LEFT (MIN 3 VIEWS)  Result Date: 11/25/2022  Hilus valgus deformity no fracture or dislocation     XR FOOT RIGHT (MIN 3 VIEWS)  Result Date: 11/25/2022  Marked hallux valgus deformity. MRI LUMBAR SPINE WO CONTRAST  Result Date: 11/25/2022  1. Severe spinal canal stenosis and moderate to severe left neural foraminal narrowing at L3-4 secondary to grade 1 anterolisthesis, disc bulge, facet arthropathy and thickening of the ligamentum flavum. 2. Moderate spinal canal stenosis and severe right neural foraminal narrowing at L4-5 secondary to a disc bulge, facet arthropathy and thickening of the ligamentum flavum. 3. Mild spinal canal stenosis at L1-L2 and L2-L3, as described above. 4. Mild bilateral neural foraminal narrowing at L5-S1     XR CHEST PORTABLE  Result Date: 11/23/2022  No acute process. Mildly elevated right hemidiaphragm. US DUP LOWER EXTREMITIES BILATERAL VENOUS  Result Date: 11/25/2022  No evidence of DVT in either lower extremity. Medications/IV's:  The patient is currently using GCS for DVT prophylaxis. Scheduled:    atorvastatin, 20 mg, Oral, Nightly    calcium elemental, 500 mg, Oral, Daily    hydrALAZINE, 25 mg, Oral, BID    metoprolol tartrate, 25 mg, Oral, BID    Vitamin D, 1,000 Units, Oral, Daily    allopurinol, 50 mg, Oral, Daily    predniSONE, 40 mg, Oral, Daily    PRN:  loperamide, acetaminophen, benzonatate, oxyCODONE    Allergies: Allergies   Allergen Reactions    Ace Inhibitors Other (See Comments)     Cough           Most Recent Vitals, Height and Weight  BP (!) 142/61   Pulse 65   Temp 98.1 °F (36.7 °C) (Oral)   Resp 18   Ht 5' 5.98\" (1.676 m)   Wt 220 lb (99.8 kg)   SpO2 97%   BMI 35.53 kg/m²     Weight Bearing Restrictions: wbat    Current Diet Order: ADULT DIET;  Regular; Low Fat/Low Chol/High Fiber/2 gm Na    Skin: wdl       Lungs: Respiratory  Respiratory Pattern: Regular  Respiratory Depth: Normal  Respiratory Quality/Effort: Unlabored  Chest Assessment: Chest expansion symmetrical  L Breath Sounds: Clear  R Breath Sounds: Clear  Level of Activity/Mobility: Up with assist  Subcutaneous Air/Crepitus: None      Cognition and Behavior:  Language Preference (if other than English):      Alertness/Behavior  Neuro (WDL): Within Defined Limits  Level of Consciousness: Alert (0)  History of Falling: No      History of Falling: No          Prior Level of Function and Living Arrangements:  Social/Functional History  Lives With:  (Roommate)  Type of Home:  (Duplex)  Home Layout: One level  Home Access: Stairs to enter with rails  Entrance Stairs - Number of Steps: 4  Entrance Stairs - Rails: Both  Bathroom Shower/Tub: Tub/Shower unit  Bathroom Equipment: Shower chair, Hand-held shower  Home Equipment: Mariano Chaudhari  Has the patient had two or more falls in the past year or any fall with injury in the past year?: No  ADL Assistance: Independent  Homemaking Assistance: Independent  Ambulation Assistance: Independent  Transfer Assistance: Independent  Active : Yes  Occupation: Full time employment  Type of Occupation:   Living Arrangements: Parent (room mate)  Support Systems: Children, Family Members  Type of Home Care Services: None  Dental Appliances: None  Vision - Corrective Lenses: Eyeglasses  Hearing Aid: None  Personal Equipment:   Dental Appliances: None  Vision - Corrective Lenses: Eyeglasses  Hearing Aid: None      CURRENT FUNCTIONAL LEVEL:  Physical Therapy  Bed mobility:  Bed mobility  Supine to Sit: Stand by assistance (11/26/22 1318)  Sit to Supine: Stand by assistance (11/26/22 1318)  Bed Mobility Comments: NT - pt on commode pre-tx then up to chair post-tx to promote out of bed activity (11/27/22 1015)  Bed Mobility Training  Bed Mobility Training: Yes (11/30/22 1605)  Interventions: Verbal cues (11/30/22 1557)  Rolling: Stand-by assistance (11/30/22 1557)  Supine to Sit: Contact-guard assistance (11/30/22 1557)  Scooting: Stand-by assistance (11/30/22 1557)  Transfers:  Transfers  Sit to Stand: Minimal Assistance (11/27/22 1015)  Stand to Sit: Contact guard assistance (11/27/22 1015)  Comment: VC's for safe hand placement w/ good follow through - pt's sister present on pt's other side for pt comfort, however no physical assist needed (11/27/22 1015)  Transfer Training  Transfer Training: Yes (11/30/22 1557)  Overall Level of Assistance: Stand-by assistance;Contact-guard assistance (11/29/22 0935)  Interventions: Verbal cues (11/30/22 1557)  Sit to Stand: Contact-guard assistance (11/30/22 1557)  Stand to Sit: Contact-guard assistance (11/30/22 1557)  Stand Pivot Transfers: Minimum assistance (11/29/22 0935)  Bed to Chair: Minimum assistance (11/29/22 0935)  Gait:   Ambulation  Surface: Level tile (11/27/22 1016)  Device: Rolling Walker (11/27/22 1016)  Assistance: Contact guard assistance;Minimal assistance (11/27/22 1016)  Quality of Gait: decreased b/l foot clearance and step length, heavy UE use on [employer], flexed forward posture (11/27/22 1016)  Distance: 3ft from [facility] to chair (11/27/22 1016)  Gait Training: Yes (11/30/22 1557)  Overall Level of Assistance: Contact-guard assistance (11/30/22 1557)  Distance (ft): 20 Feet (10' x 2) (11/30/22 1557)  Assistive Device: Honora Sables, rolling (11/30/22 1557)  Interventions: Verbal cues (11/30/22 1557)  Base of Support: Widened (11/30/22 1557)  Speed/Elyse: Jossue Sack decreased (< 100 feet/min) (11/30/22 1557)  Step Length: Left shortened;Right shortened (11/30/22 1557)  Gait Abnormalities: Trunk sway increased;Decreased step clearance (11/30/22 1557)  Right Side Weight Bearing: As tolerated (11/29/22 0935)  Left Side Weight Bearing: As tolerated (11/29/22 0935)  Stairs:     W/C mobility:         Occupational Therapy  Hand Dominance: Left  ADL  Feeding: Independent (11/28/22 1610)  Grooming: Supervision (11/28/22 1610)  UE Bathing: Setup (11/28/22 1610)  LE Bathing: Minimal assistance (11/28/22 1610)  UE Dressing: Setup (11/28/22 1610)  LE Dressing:  Moderate assistance (11/28/22 1610)  Toileting: Contact guard assistance (11/28/22 1610)  Additional Comments: Simulated ADLs as above, limited d/t fatigue and weakness, difficulty with standing balance as well as decreased maintenance of figure 4 at EOB (11/28/22 1610)  Toilet Transfers  Toilet - Technique: Stand step (11/28/22 1611)  Equipment Used: Standard bedside commode (11/28/22 1611)  Toilet Transfer: Contact guard assistance (11/28/22 1611)  Toilet Transfers Comments: Increased effort (11/28/22 1611)            Speech Language Pathology n/a      Current Conditions Requiring Inpatient Rehabilitation  Bowel/Bladder Dysfunction: Yes  Intervention Required = Frequent toileting  Risk for Medical/Clinical Complications = moderate  Skin Healing/Breakdown Risk: Yes  Intervention Required = Side to side turns  Risk for Medical/Clinical Complications = low  Nutrition/Hydration Deficiency: Yes  Intervention Required = Monitor I&Os and Check Labs  Risk for Medical/Clinical Complications = moderate  Medical Comorbidities: Yes  Intervention Required = DVT risk  Risk for Medical/Clinical Complications = moderate    Rehab/Skilled Needs:   3 hours of Intensive Acute Rehab therapy daily, 5 days/week for a total of 900 minutes  PT Treatment Time:  1.5 hrs/day  OT Treatment Time: 1.5 hrs/day  Rehabilitation Nursing   Case management/Social work    Cultural needs:   Values / Beliefs  Do You Have Any Ethnic, Cultural, Sacramental, or Spiritual Gnosticism Needs You Would Like Us To Be Aware of While You Are in the Hospital : No   Funding needs:     none    Expected Level of Improvement with Rehab  Assist for ADL Modified [place]  Assist for Transfers [place]  Assist for Gait Modified [place]    Patient's willingness to participate: Yes  Patient's ability to tolerate proposed care: Yes  Patient/Family Goals of Rehab (in patient's/family's own words): get moving like before and go back home    Anticipated Discharge Plan:  Home with   [address], RN PT OT Aide to be determined      Barriers to Discharge:  Home entry accessibility  Equipment needs  Caregiver availability  Resource availability      Rehab evaluation plan: Recommend Acute Inpatient Rehab  Patient declined and requested Discharge home with Sister  Reviewer's Signature: Electronically signed by Nereyda Davis RN on 12/1/22 at 2:12 PM EST

## 2022-11-28 NOTE — PROGRESS NOTES
Neurology Follow up    SUBJECTIVE: Patient seen and examined for neurology follow-up. Currently alert and oriented x3, no acute distress, cooperative. Lower extremity pain significantly improved. No erythema of joints noted. Denies back pain. No radiculopathy. Afebrile. Denies headache. No bowel or bladder dysfunction. Patient actually is improving with pain which is less and she is not able to be more mobile.   Current Facility-Administered Medications   Medication Dose Route Frequency Provider Last Rate Last Admin    loperamide (IMODIUM) capsule 2 mg  2 mg Oral 4x Daily PRN [facility], DO   2 mg at 11/28/22 0141    acetaminophen (TYLENOL) tablet 650 mg  650 mg Oral Q4H PRN [facility], DO   650 mg at 11/26/22 1044    atorvastatin (LIPITOR) tablet 20 mg  20 mg Oral Nightly [facility], DO   20 mg at 11/27/22 1945    benzonatate (TESSALON) capsule 200 mg  200 mg Oral TID PRN [facility], DO        calcium elemental (OSCAL) tablet 500 mg  500 mg Oral Daily [facility], DO   500 mg at 11/28/22 1007    hydrALAZINE (APRESOLINE) tablet 25 mg  25 mg Oral BID [facility], DO   25 mg at 11/28/22 1004    metoprolol tartrate (LOPRESSOR) tablet 25 mg  25 mg Oral BID [facility], DO   25 mg at 11/28/22 1003    vitamin D (CHOLECALCIFEROL) tablet 1,000 Units  1,000 Units Oral Daily [facility], DO   1,000 Units at 11/28/22 1005    oxyCODONE (ROXICODONE) immediate release tablet 5 mg  5 mg Oral Q6H PRN Glenice Cease, DO   5 mg at 11/25/22 0800    allopurinol (ZYLOPRIM) tablet 50 mg  50 mg Oral Daily Glenice Cease, DO   50 mg at 11/28/22 1007    lactated ringers infusion   IntraVENous Continuous Marcus Labor Sedar,  mL/hr at 11/28/22 0328 New Bag at 11/28/22 0328    predniSONE (DELTASONE) tablet 40 mg  40 mg Oral Daily Sergei D Sedar, DO   40 mg at 11/28/22 1003       PHYSICAL EXAM:    /61   Pulse 73   Temp 98 °F (36.7 °C) (Oral)   Resp 18   Ht 5' 5.98\" (1.676 m)   Wt 220 lb (99.8 kg) SpO2 97%   BMI 35.53 kg/m²    General Appearance:      Skin:  normal  CVS - Normal sounds, No murmurs , No carotid Bruits  RS -CTA  Abdomen Soft, BS present  Review of Systems   Constitutional:  Negative for appetite change and fever. HENT:  Negative for hearing loss and trouble swallowing. Eyes:  Negative for visual disturbance. Respiratory:  Negative for cough, chest tightness, shortness of breath and wheezing. Cardiovascular:  Negative for chest pain, palpitations and leg swelling. Gastrointestinal:  Negative for abdominal distention, abdominal pain, nausea and vomiting. Genitourinary:  Negative for difficulty urinating. Musculoskeletal:  Positive for gait problem. Negative for arthralgias, back pain, neck pain and neck stiffness. Skin:  Negative for color change and rash. Neurological:  Positive for weakness. Negative for dizziness, tremors, seizures, syncope, facial asymmetry, speech difficulty, light-headedness, numbness and headaches. Psychiatric/Behavioral:  Negative for agitation, confusion and hallucinations. The patient is not nervous/anxious. Exam as noted above,     Mental Status Exam:             Level of Alertness:   awake            Orientation:   person, place, time                      Attention/Concentration:  normal            Language:  normal      Funduscopic Exam:     Cranial Nerves            Cranial nerve III           Pupils:  equal, round, reactive to light      Cranial nerves III, IV, VI           Extraocular Movements: intact      Cranial nerve V           Facial sensation:  intact      Cranial nerve VII           Facial strength: intact      Cranial nerve VIII           Hearing:  intact      Cranial nerve IX           Palate:  intact      Cranial nerve XI         Shoulder shrug:  intact      Cranial nerve XII          Tongue movement:  normal    Motor:  Foot drop notable on the right though this is secondary to immobility of the ankle and not a true neurogenic foot drop. quite hyperreflexic throughout             Sensory: No definite sensory levels        Pinprick             Right Upper Extremity:  normal             Left Upper Extremity:  normal             Right Lower Extremity:  normal             Left Lower Extremity:  normal           Vibration                         Touch            Proprioception                 Coordination:           Finger/Nose   Right:  normal              Left:  normal                  Gait:                       Casual: Gait is deferred          Reflexes:             Deep Tendon Reflexes:             Reflexes are 2 +             Plantar response:                Right:  downgoing               Left:  downgoing    Vascular:  Cardiac Exam:  normal         XR CHEST PORTABLE    Result Date: 11/23/2022  EXAMINATION: ONE XRAY VIEW OF THE CHEST 11/23/2022 3:32 pm COMPARISON: None. HISTORY: ORDERING SYSTEM PROVIDED HISTORY: fatigue TECHNOLOGIST PROVIDED HISTORY: Reason for exam:->fatigue What reading provider will be dictating this exam?->CRC FINDINGS: The lungs are without acute focal process. There is no effusion or pneumothorax. The cardiomediastinal silhouette is without acute process. The osseous structures are without acute process. No acute process. Mildly elevated right hemidiaphragm. Recent Labs     11/26/22  0512 11/27/22  0511 11/28/22  0514   WBC 11.5* 8.9 9.3   HGB 10.6* 10.7* 12.3    424* 455*       Recent Labs     11/26/22  0512 11/27/22  0511 11/28/22  0514    142 139   K 4.3 4.0 5.0*    104 103   CO2 28 26 25   BUN 30* 28* 28*   CREATININE 1.36* 1.27* 1.39*   GLUCOSE 91 77 80       No results for input(s): BILITOT, ALKPHOS, AST, ALT in the last 72 hours. No results found for: PROTIME, INR  No results found for: LITHIUM, DILFRTOT, VALPROATE    ASSESSMENT AND PLAN  Polyarthritis of rheumatologic origin with swelling of the ankles with fixation of the ankles and pain.   Findings not quite history of Guillain-Barré syndrome given the underlying clinical profile and examination. I had suspected a lumbar canal stenosis given her underlying findings and patient has severe lumbar spinal canal stenosis. Neurosurgery is on consult surgery is contemplated. Her arthritic evaluation is still in progress and we await some rheumatological markers and patient continue on steroids for now. His left leg is improved considerably. 11/27  Polyarthritis with improvement notable in her swelling and ambulatory status as well as movement of her ankle on the right. The leg on the left has improved considerably. Patient is on prednisone. She is still somewhat hyperreflexic but no other ascending paralysis is notable. Inflammatory markers are pending for vasculitis. We will keep an eye on this and continue to follow. 11/28/2022:  Bilateral lower extremity pain and swelling, improved  Polyarthralgia  CRP elevated at 213, sed rate 95  Anti-SSA and anti-SSB negative  MPO negative  Uric acid 8.5  Rheumatoid arthritis panel with weak positive IgG antibodies  DWIGHT positive  Patient continues on prednisone 40 mg daily  No B12 or folate deficiencies  MRI of the lumbar spine with severe spinal canal stenosis at L3-L4  Neurosurgery is following with plans for possible microdissection once medically stable. Rehab pre-CERT pending    I have personally performed a face to face diagnostic evaluation on this patient, reviewed all data and investigations, and am the sole provider of all clinical decisions on the neurological status of this patient. Pt has improved sisnce admit and becoming more ambulatory   60 % time spent on above       Alex Novak MD, Jurgen Ewing, American Board of Psychiatry & Neurology  Board Certified in Vascular Neurology  Board Certified in Neuromuscular Medicine  Certified in . [place]

## 2022-11-28 NOTE — PROGRESS NOTES
Progress Note  Date:2022       Room:W274/W274-01  Patient Raquel Brand     Date of Birth:[birth year]     Age:62 y.o. Subjective    Subjective:  Symptoms:  She reports weakness. No shortness of breath, malaise, cough, chest pain, headache, chest pressure, anorexia, diarrhea or anxiety. Diet:  No nausea or vomiting. Review of Systems   Respiratory:  Negative for cough and shortness of breath. Cardiovascular:  Negative for chest pain. Gastrointestinal:  Negative for anorexia, diarrhea, nausea and vomiting. Neurological:  Positive for weakness. Objective         Vitals Last 24 Hours:  TEMPERATURE:  Temp  Av.1 °F (36.7 °C)  Min: 98 °F (36.7 °C)  Max: 98.1 °F (36.7 °C)  RESPIRATIONS RANGE: Resp  Av  Min: 18  Max: 18  PULSE OXIMETRY RANGE: SpO2  Av %  Min: 95 %  Max: 97 %  PULSE RANGE: Pulse  Av.5  Min: 70  Max: 73  BLOOD PRESSURE RANGE: Systolic (79CBM), NNQ:526 , Min:130 , OEB:671   ; Diastolic (38QAA), HBW:78, Min:59, Max:63    I/O (24Hr): No intake or output data in the 24 hours ending 22 1216  Objective:  General Appearance:  Comfortable, well-appearing and in no acute distress. Vital signs: (most recent): Blood pressure 136/61, pulse 73, temperature 98 °F (36.7 °C), temperature source Oral, resp. rate 18, height 5' 5.98\" (1.676 m), weight 220 lb (99.8 kg), SpO2 97 %, not currently breastfeeding. HEENT: Normal HEENT exam.    Lungs:  Breath sounds clear to auscultation. Heart: S1 normal and S2 normal.    Abdomen: Abdomen is soft. Bowel sounds are normal.   There is no epigastric area or suprapubic area tenderness. Neurological: Patient is alert and oriented to person, place and time. Pupils:  Pupils are equal, round, and reactive to light. Skin:  Warm and dry.     Labs/Imaging/Diagnostics    Labs:  CBC:  Recent Labs     22  0512 22  0511 22  0514   WBC 11.5* 8.9 9.3   RBC 3.36* 3.45* 3.86*   HGB 10.6* 10.7* 12.3   HCT 31.6* 32.5* 36.7*   MCV 94.1 94.2 95.0*   RDW 13.2 13.2 13.1    424* 455*     CHEMISTRIES:  Recent Labs     11/26/22  0512 11/27/22  0511 11/28/22  0514    142 139   K 4.3 4.0 5.0*    104 103   CO2 28 26 25   BUN 30* 28* 28*   CREATININE 1.36* 1.27* 1.39*   GLUCOSE 91 77 80     PT/INR:No results for input(s): PROTIME, INR in the last 72 hours. APTT:No results for input(s): APTT in the last 72 hours. LIVER PROFILE:No results for input(s): AST, ALT, BILIDIR, BILITOT, ALKPHOS in the last 72 hours. Imaging Last 24 Hours:  No results found. Assessment//Plan           Hospital Problems             Last Modified POA    * (Principal) Lumbar stenosis with neurogenic claudication 11/26/2022 Yes    Weakness 11/26/2022 Yes    Paraparesis of both lower limbs ([place]) 11/26/2022 Yes    Inflammatory arthritis 11/26/2022 Yes    Cauda equina syndrome ([place]) 11/28/2022 Yes    Bilateral foot-drop 11/28/2022 Yes   CKD  Admitted with bilateral lower extremity weakness due to in part due to arthropathy likely inflammatory in addition to severe spinal stenosis, as seen by neurosurgery and neurology. Awaiting PT/OT. May need rehab. Neurosurgery offered procedure but patient is reluctant to do it. Assessment & Plan  11/28: Rehab referral possible discharge to acute rehab versus home today.   Electronically signed by Harpal Almonte MD on 11/28/22 at 12:16 PM EST

## 2022-11-28 NOTE — CARE COORDINATION
METW/PT TO ASSESS NEEDS AND DISCUSS DISCHARGE PLAN. PT WOULD PREFER  REHAB FOR GAIT AND STRENGTHENING. PT STATES, \"I KNOW I NEED THERAPY BEFORE RETURNING HOME BECAUSE I AM WEAK. \" SNF LIST PROVIDED AS A SECONDARY OPTION. FOC OFFERED. CM TO FOLLOW CLINICAL COURSE.

## 2022-11-28 NOTE — PLAN OF CARE
See OT evaluation for all goals and OT POC.  Electronically signed by MELODY Hardy/L on 11/28/2022 at 4:19 PM

## 2022-11-29 ENCOUNTER — TELEPHONE (OUTPATIENT)
Dept: NEUROSURGERY | Age: 62
End: 2022-11-29

## 2022-11-29 PROBLEM — E86.0 DEHYDRATION: Status: ACTIVE | Noted: 2022-11-29

## 2022-11-29 LAB
ANION GAP SERPL CALCULATED.3IONS-SCNC: 13 MEQ/L (ref 9–15)
ANTINUCLEAR AB INTERPRETIVE COMMENT: NORMAL
ANTINUCLEAR ANTIBODY, HEP-2, IGG: NORMAL
BASOPHILS ABSOLUTE: 0 K/UL (ref 0–0.2)
BASOPHILS RELATIVE PERCENT: 0.2 %
BUN BLDV-MCNC: 25 MG/DL (ref 8–23)
CALCIUM SERPL-MCNC: 9 MG/DL (ref 8.5–9.9)
CHLORIDE BLD-SCNC: 104 MEQ/L (ref 95–107)
CO2: 25 MEQ/L (ref 20–31)
CREAT SERPL-MCNC: 1.44 MG/DL (ref 0.5–0.9)
EOSINOPHILS ABSOLUTE: 0 K/UL (ref 0–0.7)
EOSINOPHILS RELATIVE PERCENT: 0.2 %
GFR SERPL CREATININE-BSD FRML MDRD: 41 ML/MIN/{1.73_M2}
GLUCOSE BLD-MCNC: 77 MG/DL (ref 70–99)
HCT VFR BLD CALC: 32.6 % (ref 37–47)
HEMOGLOBIN: 11 G/DL (ref 12–16)
LYMPHOCYTES ABSOLUTE: 1.7 K/UL (ref 1–4.8)
LYMPHOCYTES RELATIVE PERCENT: 17.8 %
MCH RBC QN AUTO: 31.6 PG (ref 27–31.3)
MCHC RBC AUTO-ENTMCNC: 33.7 % (ref 33–37)
MCV RBC AUTO: 93.9 FL (ref 79.4–94.8)
MONOCYTES ABSOLUTE: 0.6 K/UL (ref 0.2–0.8)
MONOCYTES RELATIVE PERCENT: 5.9 %
NEUTROPHILS ABSOLUTE: 7.3 K/UL (ref 1.4–6.5)
NEUTROPHILS RELATIVE PERCENT: 75.9 %
PDW BLD-RTO: 13.2 % (ref 11.5–14.5)
PLATELET # BLD: 474 K/UL (ref 130–400)
POTASSIUM SERPL-SCNC: 3.9 MEQ/L (ref 3.4–4.9)
RBC # BLD: 3.47 M/UL (ref 4.2–5.4)
SODIUM BLD-SCNC: 142 MEQ/L (ref 135–144)
WBC # BLD: 9.7 K/UL (ref 4.8–10.8)

## 2022-11-29 PROCEDURE — 6370000000 HC RX 637 (ALT 250 FOR IP): Performed by: INTERNAL MEDICINE

## 2022-11-29 PROCEDURE — 97535 SELF CARE MNGMENT TRAINING: CPT

## 2022-11-29 PROCEDURE — APPSS15 APP SPLIT SHARED TIME 0-15 MINUTES: Performed by: NURSE PRACTITIONER

## 2022-11-29 PROCEDURE — 1210000000 HC MED SURG R&B

## 2022-11-29 PROCEDURE — 85025 COMPLETE CBC W/AUTO DIFF WBC: CPT

## 2022-11-29 PROCEDURE — 99222 1ST HOSP IP/OBS MODERATE 55: CPT | Performed by: PHYSICAL MEDICINE & REHABILITATION

## 2022-11-29 PROCEDURE — 99233 SBSQ HOSP IP/OBS HIGH 50: CPT | Performed by: PSYCHIATRY & NEUROLOGY

## 2022-11-29 PROCEDURE — 97116 GAIT TRAINING THERAPY: CPT

## 2022-11-29 PROCEDURE — 36415 COLL VENOUS BLD VENIPUNCTURE: CPT

## 2022-11-29 PROCEDURE — 80048 BASIC METABOLIC PNL TOTAL CA: CPT

## 2022-11-29 RX ADMIN — Medication 500 MG: at 09:55

## 2022-11-29 RX ADMIN — METOPROLOL TARTRATE 25 MG: 25 TABLET, FILM COATED ORAL at 09:55

## 2022-11-29 RX ADMIN — PREDNISONE 40 MG: 20 TABLET ORAL at 09:54

## 2022-11-29 RX ADMIN — HYDRALAZINE HYDROCHLORIDE 25 MG: 25 TABLET, FILM COATED ORAL at 09:54

## 2022-11-29 RX ADMIN — ALLOPURINOL 50 MG: 100 TABLET ORAL at 09:54

## 2022-11-29 RX ADMIN — METOPROLOL TARTRATE 25 MG: 25 TABLET, FILM COATED ORAL at 20:27

## 2022-11-29 RX ADMIN — Medication 1000 UNITS: at 09:54

## 2022-11-29 RX ADMIN — HYDRALAZINE HYDROCHLORIDE 25 MG: 25 TABLET, FILM COATED ORAL at 18:51

## 2022-11-29 RX ADMIN — ATORVASTATIN CALCIUM 20 MG: 20 TABLET, FILM COATED ORAL at 20:27

## 2022-11-29 ASSESSMENT — ENCOUNTER SYMPTOMS
NAUSEA: 0
TROUBLE SWALLOWING: 0
ABDOMINAL DISTENTION: 0
DIARRHEA: 0
COLOR CHANGE: 0
WHEEZING: 0
VOMITING: 0
BACK PAIN: 0
COUGH: 0
SHORTNESS OF BREATH: 0
ABDOMINAL PAIN: 0
CHEST TIGHTNESS: 0

## 2022-11-29 ASSESSMENT — PAIN SCALES - GENERAL: PAINLEVEL_OUTOF10: 0

## 2022-11-29 NOTE — PROGRESS NOTES
Progress Note  Date:2022       Room:W274/W274-01  Patient Marcela Philip     Date of Birth:[birth year]     Age:62 y.o. Subjective    Subjective:  Symptoms:  She reports weakness. No shortness of breath, malaise, cough, chest pain, headache, chest pressure, anorexia, diarrhea or anxiety. Diet:  No nausea or vomiting. Review of Systems   Respiratory:  Negative for cough and shortness of breath. Cardiovascular:  Negative for chest pain. Gastrointestinal:  Negative for anorexia, diarrhea, nausea and vomiting. Neurological:  Positive for weakness. Objective         Vitals Last 24 Hours:  TEMPERATURE:  Temp  Av.6 °F (36.4 °C)  Min: 97.5 °F (36.4 °C)  Max: 97.7 °F (36.5 °C)  RESPIRATIONS RANGE: Resp  Av  Min: 18  Max: 18  PULSE OXIMETRY RANGE: SpO2  Av %  Min: 96 %  Max: 100 %  PULSE RANGE: Pulse  Av  Min: 61  Max: 85  BLOOD PRESSURE RANGE: Systolic (66IUP), PSQ:333 , Min:139 , AGC:962   ; Diastolic (90PSS), WBT:40, Min:62, Max:79    I/O (24Hr): No intake or output data in the 24 hours ending 22 1242  Objective:  General Appearance:  Comfortable, well-appearing and in no acute distress. Vital signs: (most recent): Blood pressure 139/79, pulse 85, temperature 97.5 °F (36.4 °C), temperature source Axillary, resp. rate 18, height 5' 5.98\" (1.676 m), weight 220 lb (99.8 kg), SpO2 100 %, not currently breastfeeding. HEENT: Normal HEENT exam.    Lungs:  Breath sounds clear to auscultation. Heart: S1 normal and S2 normal.    Abdomen: Abdomen is soft. Bowel sounds are normal.   There is no epigastric area or suprapubic area tenderness. Neurological: Patient is alert and oriented to person, place and time. Pupils:  Pupils are equal, round, and reactive to light. Skin:  Warm and dry.     Labs/Imaging/Diagnostics    Labs:  CBC:  Recent Labs     22  0511 22  0514 22  0526   WBC 8.9 9.3 9.7   RBC 3.45* 3.86* 3.47*   HGB 10.7* 12.3 11.0* HCT 32.5* 36.7* 32.6*   MCV 94.2 95.0* 93.9   RDW 13.2 13.1 13.2   * 455* 474*       CHEMISTRIES:  Recent Labs     11/27/22 0511 11/28/22 0514 11/29/22 0526    139 142   K 4.0 5.0* 3.9    103 104   CO2 26 25 25   BUN 28* 28* 25*   CREATININE 1.27* 1.39* 1.44*   GLUCOSE 77 80 77       PT/INR:No results for input(s): PROTIME, INR in the last 72 hours. APTT:No results for input(s): APTT in the last 72 hours. LIVER PROFILE:No results for input(s): AST, ALT, BILIDIR, BILITOT, ALKPHOS in the last 72 hours. Imaging Last 24 Hours:  No results found. Assessment//Plan           Hospital Problems             Last Modified POA    * (Principal) Lumbar stenosis with neurogenic claudication 11/29/2022 Yes    Overview Signed 11/29/2022  8:05 AM by Dewanda Leyden, DO      Severe spinal canal stenosis and moderate to severe left neural foraminal narrowing at L3-4 secondary to grade 1 anterolisthesis, disc bulge, facet arthropathy and thickening of the ligamentum flavum. Weakness 11/26/2022 Yes    Paraparesis of both lower limbs ([address] 11/26/2022 Yes    Inflammatory arthritis 11/26/2022 Yes    Cardiomyopathy ([address] 11/29/2022 Yes    Sleep disorder 11/29/2022 Yes    Cauda equina syndrome ([address] 11/28/2022 Yes    Bilateral foot-drop 11/28/2022 Yes    Dehydration 11/29/2022 Yes    Hypertension 11/29/2022 Yes    Depression 11/29/2022 Yes   CKD  Admitted with bilateral lower extremity weakness due to in part due to arthropathy likely inflammatory in addition to severe spinal stenosis, as seen by neurosurgery and neurology. Awaiting PT/OT. May need rehab. Neurosurgery offered procedure but patient is reluctant to do it. Assessment & Plan  11/28: Rehab referral possible discharge to acute rehab versus home today. 35/17: Awaiting pre-CERT to acute rehab. Dany aj  for lower extremity edema.   Echo noted with normal EF this year   Electronically signed by Genie Gamez MD on 11/28/22 at 12:16 PM EST

## 2022-11-29 NOTE — PLAN OF CARE
Problem: Skin/Tissue Integrity  Goal: Absence of new skin breakdown  Description: 1. Monitor for areas of redness and/or skin breakdown  2. Assess vascular access sites hourly  3. Every 4-6 hours minimum:  Change oxygen saturation probe site  4. Every 4-6 hours:  If on nasal continuous positive airway pressure, respiratory therapy assess nares and determine need for appliance change or resting period.   11/29/2022 0251 by Ying Olivier RN  Outcome: Progressing     Problem: Pain  Goal: Verbalizes/displays adequate comfort level or baseline comfort level  11/29/2022 1109 by Jay Vega RN  Outcome: Progressing  11/29/2022 0251 by Ying Olivier RN  Outcome: Progressing     Problem: Safety - Adult  Goal: Free from fall injury  11/29/2022 1109 by Jay Vega RN  Outcome: Progressing  11/29/2022 0251 by Ying Olivier RN  Outcome: Progressing

## 2022-11-29 NOTE — DISCHARGE INSTR - COC
Continuity of Care Form    Patient Name: Eduardo Landin   :  1960  MRN:  [id number]    Admit date:  2022  Discharge date:  ***    Code Status Order: Full Code   Advance Directives:     Admitting Physician:  Nate Dobson DO  PCP: Krysta Philippe MD    Discharging Nurse: [facility] Unit/Room#: E755/J382-58  Discharging Unit Phone Number: ***    Emergency Contact:   Extended Emergency Contact Information  Primary Emergency Contact: [address] Phone: [phone]  Relation: Brother/Sister    Past Surgical History:  Past Surgical History:   Procedure Laterality Date    BACK SURGERY      disk removal    CARDIAC CATHETERIZATION      Had infusion of wrong fluids with ablation. ..flash pulmonary edema. Loreatha Press on vent x 4 hours in ICU. ..cardiac cath to follow negative    ENDOMETRIAL ABLATION      TONSILLECTOMY      WISDOM TOOTH EXTRACTION         Immunization History:   Immunization History   Administered Date(s) Administered    COVID-19, PFIZER GRAY top, DO NOT Dilute, (age 15 y+), IM, 30 mcg/0.3 mL 2022    COVID-19, PFIZER PURPLE top, DILUTE for use, (age 15 y+), 30mcg/0.3mL 2021, 05/15/2021    Influenza Virus Vaccine 2019    Influenza, Sandra Montanez, 6 mo and older, IM (Fluzone, Flulaval) 2017    Pneumococcal Conjugate 13-valent ([id number]) 2020    Tdap (Boostrix, Adacel) 2012    Zoster Recombinant (Shingrix) 2020, 2021       Active Problems:  Patient Active Problem List   Diagnosis Code    Hypertension I10    Depression F32. A    Family history of ischemic heart disease Z82.49    Palpitations R00.2    High cholesterol E78.00    Weakness R53.1    Paraparesis of both lower limbs (HCC) G82.20    Lumbar stenosis with neurogenic claudication M48.062    Inflammatory arthritis M19.90    Cardiomyopathy (HCC) I42.9    Migraine headache G43.909    Nonrheumatic aortic (valve) insufficiency I35.1    Sleep disorder G47.9    Rheumatic tricuspid insufficiency I07.1    Tricuspid valve insufficiency I07.1    Ventricular premature depolarization I49.3    Cauda equina syndrome (HCC) G83.4    Bilateral foot-drop M21.371, M21.372       Isolation/Infection:   Isolation            No Isolation          Patient Infection Status       Infection Onset Added Last Indicated Last Indicated By Review Planned Expiration Resolved Resolved By    None active    Resolved    COVID-19 (Rule Out) 22 Respiratory Panel, Molecular, with COVID-19 (Restricted: peds pts or suitable admitted adults) (Ordered)   22 Rule-Out Test Resulted    COVID-19 (Rule Out) 22 COVID-19, Rapid (Ordered)   22 Rule-Out Test Resulted            Nurse Assessment:  Last Vital Signs: /79   Pulse 85   Temp 97.5 °F (36.4 °C) (Axillary)   Resp 18   Ht 5' 5.98\" (1.676 m)   Wt 220 lb (99.8 kg)   SpO2 100%   BMI 35.53 kg/m²     Last documented pain score (0-10 scale): Pain Level: 0  Last Weight:   Wt Readings from Last 1 Encounters:   22 220 lb (99.8 kg)     Mental Status:  {IP PT MENTAL STATUS:87465}    IV Access:  { EDENILSON IV ACCESS:127378214}    Nursing Mobility/ADLs:  Walking   {P DME VXOS:631843343}  Transfer  {P DME TTSA:892161653}  Bathing  {P DME HMBM:900270256}  Dressing  {P DME JMVD:071371516}  Toileting  {P DME QOEC:571157280}  Feeding  {P DME AC}  Med Admin  {P DME ZQVA:502240219}  Med Delivery   { EDENILSON MED Delivery:011344645}    Wound Care Documentation and Therapy:        Elimination:  Continence: Bowel: {YES / RI:16242}  Bladder: {YES / UT:59834}  Urinary Catheter: {Urinary Catheter:584169094}   Colostomy/Ileostomy/Ileal Conduit: {YES / MS:59317}       Date of Last BM: ***  No intake or output data in the 24 hours ending 22 1121  No intake/output data recorded.     Safety Concerns:     [address] PYLE Safety Concerns:807036109}    Impairments/Disabilities:      [address] PYLE Impairments/Disabilities:604798552}    Nutrition Therapy:  Current Nutrition Therapy:   [address] May EDENILSON Diet List:384257198}    Routes of Feeding: {CHP DME Other Feedings:409008449}  Liquids: {Slp liquid thickness:45715}  Daily Fluid Restriction: {CHP DME Yes amt example:990139081}  Last Modified Barium Swallow with Video (Video Swallowing Test): {Done Not Done [facility]:520301997}    Treatments at the Time of Hospital Discharge:   Respiratory Treatments: ***  Oxygen Therapy:  {Therapy; copd oxygen:39442}  Ventilator:    { CC Vent K[employer]:903205140}    Rehab Therapies: {THERAPEUTIC INTERVENTION:5636697585}  Weight Bearing Status/Restrictions: 50Beba HOUSER Weight Bearin}  Other Medical Equipment (for information only, NOT a DME order):  {EQUIPMENT:288689138}  Other Treatments: ***    Patient's personal belongings (please select all that are sent with patient):  {[facility] DME Belongings:972827588}    RN SIGNATURE:  {Esignature:422337064}    CASE MANAGEMENT/SOCIAL WORK SECTION    Inpatient Status Date: 22    Readmission Risk Assessment Score:  Readmission Risk              Risk of Unplanned Readmission:  14           Discharging to Facility/ Agency   Name: [facility]   Address:  Phone:  Fax:    Dialysis Facility (if applicable)   Name:  Address:  Dialysis Schedule:  Phone:  Fax:    / signature: Electronically signed by KARTHIK Zhang on 22 at 11:21 AM EST    PHYSICIAN SECTION    Prognosis: {Prognosis:5675801007}    Condition at Discharge: 50Beba May Patient Condition:092247970}    Rehab Potential (if transferring to Rehab): {Prognosis:2962872340}    Recommended Labs or Other Treatments After Discharge: ***    Physician Certification: I certify the above information and transfer of Rose Mary Rodriguez  is necessary for the continuing treatment of the diagnosis listed and that she requires {Admit to Appropriate Level of Care:25813} for {GREATER/LESS:248267442} 30 days.      Update Admission H&P: {CHP DME Changes in YDXKM:512835098}    PHYSICIAN SIGNATURE:  {Esignature:181317687}

## 2022-11-29 NOTE — PLAN OF CARE
Problem: Skin/Tissue Integrity  Goal: Absence of new skin breakdown  Description: 1. Monitor for areas of redness and/or skin breakdown  2. Assess vascular access sites hourly  3. Every 4-6 hours minimum:  Change oxygen saturation probe site  4. Every 4-6 hours:  If on nasal continuous positive airway pressure, respiratory therapy assess nares and determine need for appliance change or resting period. Outcome: Progressing     Problem: Pain  Goal: Verbalizes/displays adequate comfort level or baseline comfort level  Outcome: Progressing     Problem: Safety - Adult  Goal: Free from fall injury  Outcome: Progressing     Problem: Occupational Therapy - Adult  Goal: By Discharge: Performs self-care activities at highest level of function for planned discharge setting. See evaluation for individualized goals.   11/28/2022 1619 by Shi Mathews OTR/L  Outcome: Progressing

## 2022-11-29 NOTE — PLAN OF CARE
Problem: Skin/Tissue Integrity  Goal: Absence of new skin breakdown  Description: 1. Monitor for areas of redness and/or skin breakdown  2. Assess vascular access sites hourly  3. Every 4-6 hours minimum:  Change oxygen saturation probe site  4. Every 4-6 hours:  If on nasal continuous positive airway pressure, respiratory therapy assess nares and determine need for appliance change or resting period.   11/29/2022 0251 by Rosana Tena RN  Outcome: Progressing     Problem: Pain  Goal: Verbalizes/displays adequate comfort level or baseline comfort level  11/29/2022 1515 by Brenna Saenz RN  Outcome: Progressing  11/29/2022 1109 by Brenna Saenz RN  Outcome: Progressing  11/29/2022 0251 by Rosana Tena RN  Outcome: Progressing     Problem: Safety - Adult  Goal: Free from fall injury  11/29/2022 1515 by Brenna Saenz RN  Outcome: Progressing  11/29/2022 1109 by Brenna Saenz RN  Outcome: Progressing  11/29/2022 0251 by Rosana Tena RN  Outcome: Progressing

## 2022-11-29 NOTE — PROGRESS NOTES
Physical Therapy Med Surg Daily Treatment Note  Facility/Department: [facility]  Room: [place] [id number]       NAME: Izzy Dover  : 1960 (58 y.o.)  MRN: [id number]  CODE STATUS: Full Code    Date of Service: 2022    Patient Diagnosis(es): Dehydration [E86.0]  Weakness [R53.1]  Generalized weakness [R53.1]  Paresthesia of foot, bilateral [R20.2]   Chief Complaint   Patient presents with    Illness     Pt has been feeling sick for 3 days (pt having chills, fever, and diarrhea). Pt has gotten out of bed for 3 days      Patient Active Problem List    Diagnosis Date Noted    Cardiomyopathy ([facility] 2022    Migraine headache 2022    Sleep disorder 2022    Tricuspid valve insufficiency 2022    Cauda equina syndrome ([facility] 2022    Bilateral foot-drop 2022    Paraparesis of both lower limbs ([facility] 2022    Lumbar stenosis with neurogenic claudication 2022    Inflammatory arthritis 2022    Weakness 2022    Nonrheumatic aortic (valve) insufficiency 2022    Rheumatic tricuspid insufficiency 2022    Ventricular premature depolarization 2022    High cholesterol     Hypertension     Depression     Family history of ischemic heart disease     Palpitations         Past Medical History:   Diagnosis Date    ACE-inhibitor cough     Colon polyp     Depression     Diverticulosis     Family history of ischemic heart disease     High cholesterol     Hypertension     Palpitations     Vaginal dryness      Past Surgical History:   Procedure Laterality Date    BACK SURGERY      disk removal    CARDIAC CATHETERIZATION      Had infusion of wrong fluids with ablation. ..flash pulmonary edema. Shanelle Zapata on vent x 4 hours in ICU. ..cardiac cath to follow negative    ENDOMETRIAL ABLATION      TONSILLECTOMY      WISDOM TOOTH EXTRACTION              Restrictions:fall risk       SUBJECTIVE:   Subjective: \"I am feeling much better\"    Pain  Pain: denies    OBJECTIVE: Orientation  Overall Orientation Status: Within Functional Limits  Cognition  Overall Cognitive Status: WFL    Bed Mobility Training  Bed Mobility Training: No    Transfer Training  Transfer Training: Yes  Overall Level of Assistance: Stand-by assistance;Contact-guard assistance  Interventions: Safety awareness training; Tactile cues  Sit to Stand: Stand-by assistance;Contact-guard assistance  Stand to Sit: Stand-by assistance;Contact-guard assistance  Stand Pivot Transfers: Minimum assistance  Bed to Chair: Minimum assistance    Gait Training: Yes  Overall Level of Assistance: Contact-guard assistance  Distance (ft): 5 Feet x 2 with break  Assistive Device: Walker, rolling  Interventions: Safety awareness training; Tactile cues  Base of Support: Widened  Speed/Elyse: Delayed  Step Length: Right shortened  Gait Abnormalities: Step to gait  Right Side Weight Bearing: As tolerated  Left Side Weight Bearing: As tolerated                                         ASSESSMENT pt tolerated short distance around room. Rearranged room so chair with alarm was next to bed and commode close by. Pt able to ambulate without losing balance but did lose balance while transferring to chair from bed. Min assist needed. Pt up in chair with alarm. Discharge Recommendations:  Continue to assess pending progress, Therapy recommended at discharge         Goals  Short Term Goals  Short Term Goal 1: Pt will be able to complete all transfers with SBA. Short Term Goal 2: Pt will demonstrate Fair+ static and dynamic stnading balance. Short Term Goal 3: Pt will be able to ambulate >/= 48' with LRD with good stabiltiy and foot clearance CGA. Short Term Goal 4: Improve geno LE strength to increase ease with all transfers and mobility.     PLAN    General Plan: 1 time a day 3-6 times a week        AMPA (6 CLICK) BASIC MOBILITY  AM-PAC Inpatient Mobility Raw Score : 17     Therapy Time   Individual   Time In  0900   Time Out 8949 Minutes 23   8 minutes gt  15 minutes bed mob/transfers          [facility] LUCRETIA FAGAN KRISTY, 11/29/22 at 9:36 AM         Definitions for assistance levels  Independent = pt does not require any physical supervision or assistance from another person for activity completion. Device may be needed.   Stand by assistance = pt requires verbal cues or instructions from another person, close to but not touching, to perform the activity  Minimal assistance= pt performs 75% or more of the activity; assistance is required to complete the activity  Moderate assistance= pt performs 50% of the activity; assistance is required to complete the activity  Maximal assistance = pt performs 25% of the activity; assistance is required to complete the activity  Dependent = pt requires total physical assistance to accomplish the task

## 2022-11-29 NOTE — PROGRESS NOTES
Patient may or may not be a surgical candidate in the future. No surgical intervention will be done this admission. Recommend other conservative treatment modalities to include rehab and PT/OT.

## 2022-11-29 NOTE — TELEPHONE ENCOUNTER
Left message on voicemail for patient to call. Need to schedule one month follow up from hospital consult with Dr. Lilly Schulte (around 12-). Please remind patient to have x-rays lumbar done a few days before follow up appointment.

## 2022-11-29 NOTE — PROGRESS NOTES
Neurology Follow up    SUBJECTIVE: Patient seen and examined for neurology follow-up. Currently alert and oriented x3, no acute distress, cooperative. Lower extremity pain significantly improved. No erythema of joints noted. Denies back pain. No radiculopathy. Afebrile. Denies headache. No bowel or bladder dysfunction.     As noted above, pain much better, just able to stand       Current Facility-Administered Medications   Medication Dose Route Frequency Provider Last Rate Last Admin    loperamide (IMODIUM) capsule 2 mg  2 mg Oral 4x Daily PRN Sparkle Donate, DO   2 mg at 11/28/22 0141    acetaminophen (TYLENOL) tablet 650 mg  650 mg Oral Q4H PRN Sparkle Donate, DO   650 mg at 11/26/22 1044    atorvastatin (LIPITOR) tablet 20 mg  20 mg Oral Nightly Sparkle Donate, DO   20 mg at 11/28/22 2016    benzonatate (TESSALON) capsule 200 mg  200 mg Oral TID PRN Sparkle Donate, DO        calcium elemental (OSCAL) tablet 500 mg  500 mg Oral Daily Sparkle Donate, DO   500 mg at 11/29/22 0955    hydrALAZINE (APRESOLINE) tablet 25 mg  25 mg Oral BID Sparkle Donate, DO   25 mg at 11/29/22 0954    metoprolol tartrate (LOPRESSOR) tablet 25 mg  25 mg Oral BID Sparkle Donate, DO   25 mg at 11/29/22 0955    vitamin D (CHOLECALCIFEROL) tablet 1,000 Units  1,000 Units Oral Daily Sparkle Donate, DO   1,000 Units at 11/29/22 0954    oxyCODONE (ROXICODONE) immediate release tablet 5 mg  5 mg Oral Q6H PRN Georganna Ends, DO   5 mg at 11/25/22 0800    allopurinol (ZYLOPRIM) tablet 50 mg  50 mg Oral Daily Georganna Ends, DO   50 mg at 11/29/22 3932    lactated ringers infusion   IntraVENous Continuous Malcolmrol Robert Ortiz,  mL/hr at 11/28/22 2348 New Bag at 11/28/22 2348    predniSONE (DELTASONE) tablet 40 mg  40 mg Oral Daily Sergei Ortiz, DO   40 mg at 11/29/22 0954       PHYSICAL EXAM:    /79   Pulse 85   Temp 97.5 °F (36.4 °C) (Axillary)   Resp 18   Ht 5' 5.98\" (1.676 m)   Wt 220 lb (99.8 kg)   SpO2 100%   BMI 35.53 kg/m²    General Appearance:      Skin:  normal  CVS - Normal sounds, No murmurs , No carotid Bruits  RS -CTA  Abdomen Soft, BS present  Review of Systems   Constitutional:  Negative for appetite change and fever. HENT:  Negative for hearing loss and trouble swallowing. Eyes:  Negative for visual disturbance. Respiratory:  Negative for cough, chest tightness, shortness of breath and wheezing. Cardiovascular:  Negative for chest pain, palpitations and leg swelling. Gastrointestinal:  Negative for abdominal distention, abdominal pain, nausea and vomiting. Genitourinary:  Negative for difficulty urinating. Musculoskeletal:  Positive for gait problem. Negative for arthralgias, back pain, neck pain and neck stiffness. Skin:  Negative for color change and rash. Neurological:  Positive for weakness. Negative for dizziness, tremors, seizures, syncope, facial asymmetry, speech difficulty, light-headedness, numbness and headaches. Psychiatric/Behavioral:  Negative for agitation, confusion and hallucinations. The patient is not nervous/anxious. Exam as noted above,     Mental Status Exam:             Level of Alertness:   awake            Orientation:   person, place, time                      Attention/Concentration:  normal            Language:  normal      Funduscopic Exam:     Cranial Nerves            Cranial nerve III           Pupils:  equal, round, reactive to light      Cranial nerves III, IV, VI           Extraocular Movements: intact      Cranial nerve V           Facial sensation:  intact      Cranial nerve VII           Facial strength: intact      Cranial nerve VIII           Hearing:  intact      Cranial nerve IX           Palate:  intact      Cranial nerve XI         Shoulder shrug:  intact      Cranial nerve XII          Tongue movement:  normal    Motor: Foot drop notable on the right though this is secondary to immobility of the ankle and not a true neurogenic foot drop.     quite hyperreflexic throughout             Sensory: No definite sensory levels        Pinprick             Right Upper Extremity:  normal             Left Upper Extremity:  normal             Right Lower Extremity:  normal             Left Lower Extremity:  normal           Vibration                         Touch            Proprioception                 Coordination:           Finger/Nose   Right:  normal              Left:  normal                  Gait:                       Casual: Gait is deferred          Reflexes:             Deep Tendon Reflexes:             Reflexes are 2 +             Plantar response:                Right:  downgoing               Left:  downgoing    Vascular:  Cardiac Exam:  normal       As noted above,   XR CHEST PORTABLE    Result Date: 11/23/2022  EXAMINATION: ONE XRAY VIEW OF THE CHEST 11/23/2022 3:32 pm COMPARISON: None. HISTORY: ORDERING SYSTEM PROVIDED HISTORY: fatigue TECHNOLOGIST PROVIDED HISTORY: Reason for exam:->fatigue What reading provider will be dictating this exam?->CRC FINDINGS: The lungs are without acute focal process. There is no effusion or pneumothorax. The cardiomediastinal silhouette is without acute process. The osseous structures are without acute process. No acute process. Mildly elevated right hemidiaphragm. Recent Labs     11/27/22  0511 11/28/22  0514 11/29/22  0526   WBC 8.9 9.3 9.7   HGB 10.7* 12.3 11.0*   * 455* 474*       Recent Labs     11/27/22  0511 11/28/22  0514 11/29/22  0526    139 142   K 4.0 5.0* 3.9    103 104   CO2 26 25 25   BUN 28* 28* 25*   CREATININE 1.27* 1.39* 1.44*   GLUCOSE 77 80 77       No results for input(s): BILITOT, ALKPHOS, AST, ALT in the last 72 hours. No results found for: PROTIME, INR  No results found for: LITHIUM, DILFRTOT, VALPROATE    ASSESSMENT AND PLAN  Polyarthritis of rheumatologic origin with swelling of the ankles with fixation of the ankles and pain.   Findings not quite history of Guillain-Barré syndrome given the underlying clinical profile and examination. I had suspected a lumbar canal stenosis given her underlying findings and patient has severe lumbar spinal canal stenosis. Neurosurgery is on consult surgery is contemplated. Her arthritic evaluation is still in progress and we await some rheumatological markers and patient continue on steroids for now. His left leg is improved considerably. 11/27  Polyarthritis with improvement notable in her swelling and ambulatory status as well as movement of her ankle on the right. The leg on the left has improved considerably. Patient is on prednisone. She is still somewhat hyperreflexic but no other ascending paralysis is notable. Inflammatory markers are pending for vasculitis. We will keep an eye on this and continue to follow. 11/28/2022:  Bilateral lower extremity pain and swelling, improved  Polyarthralgia  CRP elevated at 213, sed rate 95  Anti-SSA and anti-SSB negative  MPO negative  Uric acid 8.5  Rheumatoid arthritis panel with weak positive IgG antibodies  DWIGHT positive  Patient continues on prednisone 40 mg daily  No B12 or folate deficiencies  MRI of the lumbar spine with severe spinal canal stenosis at L3-L4  Neurosurgery is following with plans for possible microdissection once medically stable. Rehab pre-CERT pending    I have personally performed a face to face diagnostic evaluation on this patient, reviewed all data and investigations, and am the sole provider of all clinical decisions on the neurological status of this patient. Pt has improved sisnce admit and becoming more ambulatory   60 % time spent on above       11/29/22:  Polyarthralgia, improved  COnt prednisone  Severe lumbar canal stenosis with possible plans for future surgical intervention. Follow-up with neurosurgery outpatient  Discharge plan in progress  Okay to [place] from neurology standpoint. Follow-up 4 to 6 weeks.     I have personally performed a face to face diagnostic evaluation on this patient, reviewed all data and investigations, and am the sole provider of all clinical decisions on the neurological status of this patient. as noted above,better, more mobility, all labs normal except crp,keep on prednisone, Lumbar stenosis noted, and seen by Neurosurgery      Alex More MD, Maria A Mcdaniels, American Board of Psychiatry & Neurology  Board Certified in Vascular Neurology  Board Certified in Neuromuscular Medicine  Certified in [place]a Lopes

## 2022-11-30 ENCOUNTER — APPOINTMENT (OUTPATIENT)
Dept: ULTRASOUND IMAGING | Age: 62
DRG: 552 | End: 2022-11-30
Payer: COMMERCIAL

## 2022-11-30 LAB
ANION GAP SERPL CALCULATED.3IONS-SCNC: 10 MEQ/L (ref 9–15)
BASOPHILS ABSOLUTE: 0 K/UL (ref 0–0.2)
BASOPHILS RELATIVE PERCENT: 0.4 %
BUN BLDV-MCNC: 25 MG/DL (ref 8–23)
CALCIUM SERPL-MCNC: 9 MG/DL (ref 8.5–9.9)
CHLORIDE BLD-SCNC: 106 MEQ/L (ref 95–107)
CO2: 28 MEQ/L (ref 20–31)
CREAT SERPL-MCNC: 1.7 MG/DL (ref 0.5–0.9)
CREATININE URINE: 37 MG/DL
DOUBLE STRANDED DNA AB, IGG: 12 IU (ref 0–24)
EOSINOPHILS ABSOLUTE: 0 K/UL (ref 0–0.7)
EOSINOPHILS RELATIVE PERCENT: 0.3 %
GFR SERPL CREATININE-BSD FRML MDRD: 33.6 ML/MIN/{1.73_M2}
GLUCOSE BLD-MCNC: 81 MG/DL (ref 70–99)
HCT VFR BLD CALC: 33.4 % (ref 37–47)
HEMOGLOBIN: 11.3 G/DL (ref 12–16)
LYMPHOCYTES ABSOLUTE: 1.7 K/UL (ref 1–4.8)
LYMPHOCYTES RELATIVE PERCENT: 16.4 %
MCH RBC QN AUTO: 32 PG (ref 27–31.3)
MCHC RBC AUTO-ENTMCNC: 33.8 % (ref 33–37)
MCV RBC AUTO: 94.5 FL (ref 79.4–94.8)
MICROALBUMIN UR-MCNC: <1.2 MG/DL
MICROALBUMIN/CREAT UR-RTO: NORMAL MG/G (ref 0–30)
MONOCYTES ABSOLUTE: 0.6 K/UL (ref 0.2–0.8)
MONOCYTES RELATIVE PERCENT: 5.6 %
NEUTROPHILS ABSOLUTE: 8.1 K/UL (ref 1.4–6.5)
NEUTROPHILS RELATIVE PERCENT: 77.3 %
PDW BLD-RTO: 13.3 % (ref 11.5–14.5)
PLATELET # BLD: 510 K/UL (ref 130–400)
POTASSIUM SERPL-SCNC: 3.8 MEQ/L (ref 3.4–4.9)
RBC # BLD: 3.54 M/UL (ref 4.2–5.4)
SODIUM BLD-SCNC: 144 MEQ/L (ref 135–144)
WBC # BLD: 10.4 K/UL (ref 4.8–10.8)

## 2022-11-30 PROCEDURE — 2580000003 HC RX 258: Performed by: INTERNAL MEDICINE

## 2022-11-30 PROCEDURE — 76775 US EXAM ABDO BACK WALL LIM: CPT

## 2022-11-30 PROCEDURE — 85025 COMPLETE CBC W/AUTO DIFF WBC: CPT

## 2022-11-30 PROCEDURE — 6370000000 HC RX 637 (ALT 250 FOR IP): Performed by: INTERNAL MEDICINE

## 2022-11-30 PROCEDURE — 80048 BASIC METABOLIC PNL TOTAL CA: CPT

## 2022-11-30 PROCEDURE — APPSS15 APP SPLIT SHARED TIME 0-15 MINUTES: Performed by: NURSE PRACTITIONER

## 2022-11-30 PROCEDURE — 99232 SBSQ HOSP IP/OBS MODERATE 35: CPT | Performed by: PHYSICAL MEDICINE & REHABILITATION

## 2022-11-30 PROCEDURE — 99232 SBSQ HOSP IP/OBS MODERATE 35: CPT | Performed by: PSYCHIATRY & NEUROLOGY

## 2022-11-30 PROCEDURE — 82570 ASSAY OF URINE CREATININE: CPT

## 2022-11-30 PROCEDURE — 1210000000 HC MED SURG R&B

## 2022-11-30 PROCEDURE — 36415 COLL VENOUS BLD VENIPUNCTURE: CPT

## 2022-11-30 PROCEDURE — 82043 UR ALBUMIN QUANTITATIVE: CPT

## 2022-11-30 PROCEDURE — 97110 THERAPEUTIC EXERCISES: CPT

## 2022-11-30 RX ADMIN — HYDRALAZINE HYDROCHLORIDE 25 MG: 25 TABLET, FILM COATED ORAL at 18:36

## 2022-11-30 RX ADMIN — SODIUM CHLORIDE, POTASSIUM CHLORIDE, SODIUM LACTATE AND CALCIUM CHLORIDE: 600; 310; 30; 20 INJECTION, SOLUTION INTRAVENOUS at 04:54

## 2022-11-30 RX ADMIN — PREDNISONE 40 MG: 20 TABLET ORAL at 09:28

## 2022-11-30 RX ADMIN — ATORVASTATIN CALCIUM 20 MG: 20 TABLET, FILM COATED ORAL at 21:10

## 2022-11-30 RX ADMIN — Medication 500 MG: at 09:28

## 2022-11-30 RX ADMIN — Medication 1000 UNITS: at 09:32

## 2022-11-30 RX ADMIN — HYDRALAZINE HYDROCHLORIDE 25 MG: 25 TABLET, FILM COATED ORAL at 09:30

## 2022-11-30 RX ADMIN — METOPROLOL TARTRATE 25 MG: 25 TABLET, FILM COATED ORAL at 09:32

## 2022-11-30 RX ADMIN — METOPROLOL TARTRATE 25 MG: 25 TABLET, FILM COATED ORAL at 21:10

## 2022-11-30 RX ADMIN — ALLOPURINOL 50 MG: 100 TABLET ORAL at 09:31

## 2022-11-30 ASSESSMENT — ENCOUNTER SYMPTOMS
NAUSEA: 0
COUGH: 0
WHEEZING: 0
ABDOMINAL DISTENTION: 0
ABDOMINAL PAIN: 0
TROUBLE SWALLOWING: 0
DIARRHEA: 0
CHEST TIGHTNESS: 0
VOMITING: 0
SHORTNESS OF BREATH: 0
BACK PAIN: 0
COLOR CHANGE: 0

## 2022-11-30 ASSESSMENT — PAIN SCALES - GENERAL
PAINLEVEL_OUTOF10: 0
PAINLEVEL_OUTOF10: 1

## 2022-11-30 NOTE — PROGRESS NOTES
Physical Therapy Med Surg Daily Treatment Note  Facility/Department: [address] [place] [address] [address]  Room: [place] [address]       NAME: Nithin Ellington  : 1960 (58 y.o.)  MRN: [id number]  CODE STATUS: Full Code    Date of Service: 2022    Patient Diagnosis(es): Dehydration [E86.0]  Weakness [R53.1]  Generalized weakness [R53.1]  Paresthesia of foot, bilateral [R20.2]   Chief Complaint   Patient presents with    Illness     Pt has been feeling sick for 3 days (pt having chills, fever, and diarrhea). Pt has gotten out of bed for 3 days      Patient Active Problem List    Diagnosis Date Noted    Dehydration 2022    Cardiomyopathy ([facility] [address] 2022    Migraine headache 2022    Sleep disorder 2022    Tricuspid valve insufficiency 2022    Cauda equina syndrome ([address] 2022    Bilateral foot-drop 2022    Paraparesis of both lower limbs ([address] 2022    Lumbar stenosis with neurogenic claudication 2022    Inflammatory arthritis 2022    Weakness 2022    Nonrheumatic aortic (valve) insufficiency 2022    Rheumatic tricuspid insufficiency 2022    Ventricular premature depolarization 2022    High cholesterol     Hypertension     Depression     Family history of ischemic heart disease     Palpitations         Past Medical History:   Diagnosis Date    ACE-inhibitor cough     Colon polyp     Depression     Diverticulosis     Family history of ischemic heart disease     High cholesterol     Hypertension     Palpitations     Vaginal dryness      Past Surgical History:   Procedure Laterality Date    BACK SURGERY      disk removal    CARDIAC CATHETERIZATION      Had infusion of wrong fluids with ablation. ..flash pulmonary edema. Mariana Newell on vent x 4 hours in ICU. ..cardiac cath to follow negative    ENDOMETRIAL ABLATION      TONSILLECTOMY      WISDOM TOOTH EXTRACTION              Restrictions:  Restrictions/Precautions: Fall Risk    SUBJECTIVE:   Subjective: \"I would like to get up to the bathroom. \"    Pain  Pain: 2/10 R foot - mild discomfort    OBJECTIVE:        Bed Mobility Training  Bed Mobility Training: Yes  Interventions: Verbal cues  Rolling: Stand-by assistance  Supine to Sit: Contact-guard assistance  Scooting: Stand-by assistance    Transfer Training  Transfer Training: Yes  Interventions: Verbal cues  Sit to Stand: Contact-guard assistance  Stand to Sit: Contact-guard assistance    Gait Training: Yes  Overall Level of Assistance: Contact-guard assistance  Distance (ft): 20 Feet (10' x 2)  Assistive Device: Walker, rolling  Interventions: Verbal cues  Base of Support: Widened  Speed/Elyse: Pace decreased (< 100 feet/min)  Step Length: Left shortened;Right shortened  Gait Abnormalities: Trunk sway increased;Decreased step clearance      PT Exercises  A/AROM Exercises: Standing: marches x10 ( mild instability) , mini squats, hamstring curls x10 ea, hip ext x5 ea, trialed HR, unable to perform  Static Standing Balance Exercises: standing FT, FA, Staggered stance BLE advanced x30\" ea, unable to maintain staggered with LLE advanced > 10\"  Dynamic Standing Balance Exercises: trunk rotations looking over geno shoulders, fwd reaching OOBOS in all planes - no LOB no UE support        ASSESSMENT   Assessment: Pt demonstrates improvement toward goals secondary to increasing overall ambulatory distance as well as decreased assistance with bed mobility. Pt exhibits mild instability with STS as well as some standing activities, no LOB. Multiple cues provided throughout for proper technique for max benefit of ther ex and balance tasks. HEP provided to pt with balance tasks per pt request as well as bridging exercises for continued strengthening and progression toward goals. Discharge Recommendations:  Continue to assess pending progress, Therapy recommended at discharge         Goals  Short Term Goals  Short Term Goal 1: Pt will be able to complete all transfers with SBA.   Short Term Goal 2: Pt will demonstrate Fair+ static and dynamic stnading balance. Short Term Goal 3: Pt will be able to ambulate >/= 48' with LRD with good stabiltiy and foot clearance CGA. Short Term Goal 4: Improve geno LE strength to increase ease with all transfers and mobility. PLAN    General Plan: 1 time a day 3-6 times a week  Additional Comments: Cont. POC  Safety Devices  Type of Devices: All fall risk precautions in place, Call light within reach, Chair alarm in place, Left in chair     [facility] (6 CLICK) Karan 95 Raw Score : 17     Therapy Time   Individual   Time In 1532   Time Out 1550   Minutes 18      BM/Trsf: 4  Gait: 5  There ex: [address] [place] [address], [facility], 11/30/22 at 4:05 PM         Definitions for assistance levels  Independent = pt does not require any physical supervision or assistance from another person for activity completion. Device may be needed.   Stand by assistance = pt requires verbal cues or instructions from another person, close to but not touching, to perform the activity  Minimal assistance= pt performs 75% or more of the activity; assistance is required to complete the activity  Moderate assistance= pt performs 50% of the activity; assistance is required to complete the activity  Maximal assistance = pt performs 25% of the activity; assistance is required to complete the activity  Dependent = pt requires total physical assistance to accomplish the task

## 2022-11-30 NOTE — PROGRESS NOTES
Physician Progress Note      Yvonne Anguiano  [facility] #:                  [id number]  :                       1960  ADMIT DATE:       2022 2:53 PM  [address] [place] DATE:  RESPONDING  PROVIDER #:        Edgardo Vines MD          QUERY TEXT:    Patient admitted with BLE edema with pain, CKD unclear cause. Noted   documentation of Acute Kidney Injury in hospitalist progress note dated   22, 22 and per query response  SHLOMO was POA; creatinine 1.26-1.70   this admit, historically creatinine 1.43-1.70. Per Dr Kim Mckenna renal consult   note 22: CKD 3a. In order to support the diagnosis of SHLOMO, please   include additional clinical indicators in your documentation. ? Or please   document if the diagnosis of SHLOMO has been ruled out after further study. The medical record reflects the following:  Risk Factors:62 yof  HTN HLD  Clinical Indicators:  documentation of Acute Kidney Injury in hospitalist   progress note dated 22, 22 and per query response  SHLOMO was POA;   creatinine 1.26-1.70 this admit, historically creatinine 1.43-1.70. Per Dr Kim Mckenna renal consult note: CKD 3a. Treatment: IVF  renal consult [facility]af[facility] ortho consult pertinent labs    Defined by Kidney Disease Improving Global Outcomes (KDIGO) clinical practice   guideline for acute kidney injury:  -Increase in SCr by greater than or equal to 0.3 mg/dl within 48 hours; or  -Increase or decrease in SCr to greater than or equal to 1.5 times baseline,   which is known or presumed to have occurred within the prior 7 days; or  -Urine volume < 0.5ml/kg/h for 6 hours. Options provided:  -- Acute kidney injury evidenced by, Please document evidence as well as a   numerical baseline creatinine, if known.   -- Acute kidney injury ruled out after study  CKD 3a only  -- Other - I will add my own diagnosis  -- Disagree - Not applicable / Not valid  -- Disagree - Clinically unable to determine / Unknown  -- Refer to Clinical Documentation Reviewer    PROVIDER RESPONSE TEXT:    Acute kidney injury was ruled out after study CKD 3a only.     Query created by: Navneet Couch on 11/30/2022 11:29 AM      Electronically signed by:  Elvis Leone MD 11/30/2022 1:45 PM

## 2022-11-30 NOTE — PROGRESS NOTES
Progress Note  Date:2022       [facility]:[id number]  Patient Patricia Fernandez     Date of Birth:[birth year]     Age:62 y.o. Subjective    Subjective:  Symptoms:  She reports weakness. No shortness of breath, malaise, cough, chest pain, headache, chest pressure, anorexia, diarrhea or anxiety. Diet:  No nausea or vomiting. Review of Systems   Respiratory:  Negative for cough and shortness of breath. Cardiovascular:  Negative for chest pain. Gastrointestinal:  Negative for anorexia, diarrhea, nausea and vomiting. Neurological:  Positive for weakness. Objective         Vitals Last 24 Hours:  TEMPERATURE:  Temp  Av.8 °F (36.6 °C)  Min: 97.5 °F (36.4 °C)  Max: 98.1 °F (36.7 °C)  RESPIRATIONS RANGE: Resp  Av  Min: 16  Max: 18  PULSE OXIMETRY RANGE: SpO2  Av.7 %  Min: 96 %  Max: 98 %  PULSE RANGE: Pulse  Av.4  Min: 50  Max: 79  BLOOD PRESSURE RANGE: Systolic (22UHY), RUU:416 , Min:100 , CJH:730   ; Diastolic (19LUF), NHY:24, Min:51, Max:80    I/O (24Hr): No intake or output data in the 24 hours ending 22 1347  Objective:  General Appearance:  Comfortable, well-appearing and in no acute distress. Vital signs: (most recent): Blood pressure (!) 154/79, pulse 79, temperature 98.1 °F (36.7 °C), temperature source Oral, resp. rate 18, height 5' 5.98\" (1.676 m), weight 220 lb (99.8 kg), SpO2 98 %, not currently breastfeeding. HEENT: Normal HEENT exam.    Lungs:  Breath sounds clear to auscultation. Heart: S1 normal and S2 normal.    Abdomen: Abdomen is soft. Bowel sounds are normal.   There is no epigastric area or suprapubic area tenderness. Neurological: Patient is alert and oriented to person, place and time. Pupils:  Pupils are equal, round, and reactive to light. Skin:  Warm and dry.     Labs/Imaging/Diagnostics    Labs:  CBC:  Recent Labs     22  0514 22  0526 22  0508   WBC 9.3 9.7 10.4   RBC 3.86* 3.47* 3.54*   HGB 12.3 11.0* 11.3*   HCT 36.7* 32.6* 33.4*   MCV 95.0* 93.9 94.5   RDW 13.1 13.2 13.3   * 474* 510*       CHEMISTRIES:  Recent Labs     11/28/22  0514 11/29/22  0526 11/30/22  0508    142 144   K 5.0* 3.9 3.8    104 106   CO2 25 25 28   BUN 28* 25* 25*   CREATININE 1.39* 1.44* 1.70*   GLUCOSE 80 77 81       PT/INR:No results for input(s): PROTIME, INR in the last 72 hours. APTT:No results for input(s): APTT in the last 72 hours. LIVER PROFILE:No results for input(s): AST, ALT, BILIDIR, BILITOT, ALKPHOS in the last 72 hours. Imaging Last 24 Hours:  No results found. Assessment//Plan           Hospital Problems             Last Modified POA    * (Principal) Lumbar stenosis with neurogenic claudication 11/29/2022 Yes    Overview Signed 11/29/2022  8:05 AM by Raza Loving,       Severe spinal canal stenosis and moderate to severe left neural foraminal narrowing at L3-4 secondary to grade 1 anterolisthesis, disc bulge, facet arthropathy and thickening of the ligamentum flavum. Weakness 11/26/2022 Yes    Paraparesis of both lower limbs ([address] 11/26/2022 Yes    Inflammatory arthritis 11/26/2022 Yes    Cardiomyopathy ([address] 11/29/2022 Yes    Sleep disorder 11/29/2022 Yes    Cauda equina syndrome ([address] 11/28/2022 Yes    Bilateral foot-drop 11/28/2022 Yes    Dehydration 11/29/2022 Yes    Hypertension 11/29/2022 Yes    Depression 11/29/2022 Yes   CKD  Admitted with bilateral lower extremity weakness due to in part due to arthropathy likely inflammatory in addition to severe spinal stenosis, as seen by neurosurgery and neurology. Awaiting PT/OT. May need rehab. Neurosurgery offered procedure but patient is reluctant to do it. Assessment & Plan  11/28: Rehab referral possible discharge to acute rehab versus home today. 34/50: Awaiting pre-CERT to acute rehab. Dany aj  for lower extremity edema. Echo noted with normal EF this year  11/30: anticipate discharge tomorrow.   Appreciate nephrology input. Patient never seen nephrology for CKD stage III.  D/c home tomorrow   Electronically signed by Omega Deutsch MD on 11/28/22 at 12:16 PM EST

## 2022-11-30 NOTE — CONSULTS
Renal consult dictated  sed rate  & CRP have been in past    CKD-3a    Lumbar stenosis  Hypertension  Hyperlipidemia  Overweight   ?  Autoimmune disorder  Depression    Plan need ultrasound kidneys  check urine albumin  prior labs negative  Anti-DNA  ANCa complemets  follow in office  no NSAIDS  No ACE allergy    Ultrasound smaller than usual kidney size   urine test pending

## 2022-11-30 NOTE — PROGRESS NOTES
Neurology Follow up    SUBJECTIVE: Patient seen and examined for neurology follow-up. Currently alert and oriented x3, no acute distress, cooperative. Lower extremity pain significantly improved. No erythema of joints noted. Denies back pain. No radiculopathy. Afebrile. Denies headache. No bowel or bladder dysfunction.     Pt seen and walking with PT and walker   Current Facility-Administered Medications   Medication Dose Route Frequency Provider Last Rate Last Admin    loperamide (IMODIUM) capsule 2 mg  2 mg Oral 4x Daily PRN Laverle Legacy, DO   2 mg at 11/28/22 0141    acetaminophen (TYLENOL) tablet 650 mg  650 mg Oral Q4H PRN Laverle Legacy, DO   650 mg at 11/26/22 1044    atorvastatin (LIPITOR) tablet 20 mg  20 mg Oral Nightly Laverle Legacy, DO   20 mg at 11/29/22 2027    benzonatate (TESSALON) capsule 200 mg  200 mg Oral TID PRN Laverle Legacy, DO        calcium elemental (OSCAL) tablet 500 mg  500 mg Oral Daily Laverle Legacy, DO   500 mg at 11/30/22 8311    hydrALAZINE (APRESOLINE) tablet 25 mg  25 mg Oral BID Laverle Legacy, DO   25 mg at 11/30/22 0930    metoprolol tartrate (LOPRESSOR) tablet 25 mg  25 mg Oral BID Laverle Legacy, DO   25 mg at 11/30/22 0932    vitamin D (CHOLECALCIFEROL) tablet 1,000 Units  1,000 Units Oral Daily Laverle Legacy, DO   1,000 Units at 11/30/22 0932    oxyCODONE (ROXICODONE) immediate release tablet 5 mg  5 mg Oral Q6H PRN Kush May, DO   5 mg at 11/25/22 0800    allopurinol (ZYLOPRIM) tablet 50 mg  50 mg Oral Daily Kush May, DO   50 mg at 11/30/22 0931    predniSONE (DELTASONE) tablet 40 mg  40 mg Oral Daily Sergei Ortiz, DO   40 mg at 11/30/22 9122       PHYSICAL EXAM:    BP (!) 154/79   Pulse 79   Temp 98.1 °F (36.7 °C) (Oral)   Resp 18   Ht 5' 5.98\" (1.676 m)   Wt 220 lb (99.8 kg)   SpO2 98%   BMI 35.53 kg/m²    General Appearance:      Skin:  normal  CVS - Normal sounds, No murmurs , No carotid Bruits  RS -CTA  Abdomen Soft, BS present  Review of Systems   Constitutional:  Negative for appetite change and fever. HENT:  Negative for hearing loss and trouble swallowing. Eyes:  Negative for visual disturbance. Respiratory:  Negative for cough, chest tightness, shortness of breath and wheezing. Cardiovascular:  Negative for chest pain, palpitations and leg swelling. Gastrointestinal:  Negative for abdominal distention, abdominal pain, nausea and vomiting. Genitourinary:  Negative for difficulty urinating. Musculoskeletal:  Positive for gait problem. Negative for arthralgias, back pain, neck pain and neck stiffness. Skin:  Negative for color change and rash. Neurological:  Positive for weakness. Negative for dizziness, tremors, seizures, syncope, facial asymmetry, speech difficulty, light-headedness, numbness and headaches. Psychiatric/Behavioral:  Negative for agitation, confusion and hallucinations. The patient is not nervous/anxious. Exam as noted above,     Mental Status Exam:             Level of Alertness:   awake            Orientation:   person, place, time                      Attention/Concentration:  normal            Language:  normal      Funduscopic Exam:     Cranial Nerves            Cranial nerve III           Pupils:  equal, round, reactive to light      Cranial nerves III, IV, VI           Extraocular Movements: intact      Cranial nerve V           Facial sensation:  intact      Cranial nerve VII           Facial strength: intact      Cranial nerve VIII           Hearing:  intact      Cranial nerve IX           Palate:  intact      Cranial nerve XI         Shoulder shrug:  intact      Cranial nerve XII          Tongue movement:  normal    Motor:Partial Foot drop notable on the right though this is secondary to immobility of the ankle and not a true neurogenic foot drop.     quite hyperreflexic throughout,  right foot more mobile,              Sensory: No definite sensory levels        Pinprick             Right Upper Extremity:  normal             Left Upper Extremity:  normal             Right Lower Extremity:  normal             Left Lower Extremity:  normal           Vibration                         Touch            Proprioception                 Coordination:           Finger/Nose   Right:  normal              Left:  normal                  Gait:                       Casual: Gait is deferred          Reflexes:             Deep Tendon Reflexes:             Reflexes are 2 +             Plantar response:                Right:  downgoing               Left:  downgoing    Vascular:  Cardiac Exam:  normal         XR CHEST PORTABLE    Result Date: 11/23/2022  EXAMINATION: ONE XRAY VIEW OF THE CHEST 11/23/2022 3:32 pm COMPARISON: None. HISTORY: ORDERING SYSTEM PROVIDED HISTORY: fatigue TECHNOLOGIST PROVIDED HISTORY: Reason for exam:->fatigue What reading provider will be dictating this exam?->CRC FINDINGS: The lungs are without acute focal process. There is no effusion or pneumothorax. The cardiomediastinal silhouette is without acute process. The osseous structures are without acute process. No acute process. Mildly elevated right hemidiaphragm. Recent Labs     11/28/22  0514 11/29/22  0526 11/30/22  0508   WBC 9.3 9.7 10.4   HGB 12.3 11.0* 11.3*   * 474* 510*       Recent Labs     11/28/22 0514 11/29/22  0526 11/30/22  0508    142 144   K 5.0* 3.9 3.8    104 106   CO2 25 25 28   BUN 28* 25* 25*   CREATININE 1.39* 1.44* 1.70*   GLUCOSE 80 77 81       No results for input(s): BILITOT, ALKPHOS, AST, ALT in the last 72 hours. No results found for: PROTIME, INR  No results found for: LITHIUM, DILFRTOT, VALPROATE    ASSESSMENT AND PLAN  Polyarthritis of rheumatologic origin with swelling of the ankles with fixation of the ankles and pain. Findings not quite history of Guillain-Barré syndrome given the underlying clinical profile and examination.   I had suspected a lumbar canal stenosis given her underlying findings and patient has severe lumbar spinal canal stenosis. Neurosurgery is on consult surgery is contemplated. Her arthritic evaluation is still in progress and we await some rheumatological markers and patient continue on steroids for now. His left leg is improved considerably. 11/27  Polyarthritis with improvement notable in her swelling and ambulatory status as well as movement of her ankle on the right. The leg on the left has improved considerably. Patient is on prednisone. She is still somewhat hyperreflexic but no other ascending paralysis is notable. Inflammatory markers are pending for vasculitis. We will keep an eye on this and continue to follow. 11/28/2022:  Bilateral lower extremity pain and swelling, improved  Polyarthralgia  CRP elevated at 213, sed rate 95  Anti-SSA and anti-SSB negative  MPO negative  Uric acid 8.5  Rheumatoid arthritis panel with weak positive IgG antibodies  DWIGHT positive  Patient continues on prednisone 40 mg daily  No B12 or folate deficiencies  MRI of the lumbar spine with severe spinal canal stenosis at L3-L4  Neurosurgery is following with plans for possible microdissection once medically stable. Rehab pre-CERT pending    I have personally performed a face to face diagnostic evaluation on this patient, reviewed all data and investigations, and am the sole provider of all clinical decisions on the neurological status of this patient. Pt has improved sisnce admit and becoming more ambulatory   60 % time spent on above       11/30/22:  Anti DNA antibody neg  Awaiting Dc planning  Will FAD     I have personally performed a face to face diagnostic evaluation on this patient, reviewed all data and investigations, and am the sole provider of all clinical decisions on the neurological status of this patient. better, seen with PT, all labs negative for vasculitis, ok d/c 60% time spent on evdonnie Topete MD, Grecia Carey, American Board of Psychiatry & Neurology  Board Certified in Vascular Neurology  Board Certified in Neuromuscular Medicine  Certified in Neurorehabilitation

## 2022-11-30 NOTE — PROGRESS NOTES
Assessment completed, patient alert and oriented x 4,denies any pain at this time, currently up in the chair at present, am medications given whole with water, lungs clear, heart rate regular, bowel sounds active, last bm 11-30 per the patient small form, bilateral lower extremities with 2+ pitting edema, pedal pulses palpable, the patients iv in the right forearm infiltrated, fluids stopped, will find out if the fluids are still needed, pt requesting a shower

## 2022-11-30 NOTE — PROGRESS NOTES
Nutrition Assessment     Type and Reason for Visit: RD Nutrition Re-Screen/LOS    Nutrition Recommendations/Plan:    Modify Current Diet (Change to Cardiac Diet)     Malnutrition Assessment:  Malnutrition Status: No malnutrition    Nutrition Assessment:  Pt is stable from a nutritional standpoint, with verbalized good appetite/intake at meals, with no nutritional complaints. Nutrition Related Findings:   PMH- htn, hld, CKD; adm with weakness and paraparesis of both lower limbs. +2 BLE edema noted. Labs/meds reviewed. Current Nutrition Therapies:    ADULT DIET;  Regular    Anthropometric Measures:  Height: 5' 5.98\" (167.6 cm)  Current Body Wt:   220lb (stated)  BMI:  35.5    Nutrition Diagnosis:   No nutrition diagnosis at this time    Nutrition Interventions:   Food and/or Nutrient Delivery: Modify Current Diet  Nutrition Education/Counseling: Education completed  Coordination of Nutrition Care: Continue to monitor while inpatient       Goals:     Goals: PO intake 75% or greater (improvement in edema)       Nutrition Monitoring and Evaluation:      Food/Nutrient Intake Outcomes: Food and Nutrient Intake  Physical Signs/Symptoms Outcomes: Fluid Status or Edema, Biochemical Data, Weight    Discharge Planning:     No needs identified    So Escalera RD, LD

## 2022-11-30 NOTE — PROGRESS NOTES
Subjective: The patient complains of severe acute on chronic progressive fatigue and BLE weakness and mild low back pain partially relieved by rest, PT, OT and meds   and exacerbated by exertion and recent illness. I am concerned about patients medical complexities including:  Principal Problem:    Lumbar stenosis with neurogenic claudication  Active Problems:    Weakness    Paraparesis of both lower limbs (HCC)    Inflammatory arthritis    Cardiomyopathy ([address]    Sleep disorder    Cauda equina syndrome (HCC)    Bilateral foot-drop    Dehydration    Hypertension    Depression  Resolved Problems:    * No resolved hospital problems. *      .    Reviewed recent nursing note and discussed current status and planned care with acute care providers, \"Call placed to [facility] and per [address] [place] [address] Natty, prior-auth representative, patient's insurance coverage ends December 1st, 2022. Turnaround time for Auth is 15 days, so precert could not be started as no coverage would be available. Call reference# [id number]. Call placed to [facility]. Electronically signed by Xochitl Short RN on 11/29/22 at 12:43 PM EST  Spoke with patient regarding insurance and she states she has H. GRECIA Restrepo. Call placed to son who was able to verify [employer] benefit paid through 12/31/22 and provide Member ID of [id number] on paperwork. Call placed back to [facility] and spoke with Beverley Thurston Natty again. None of the [address] [place] [address] are showing any proof of payment received or [employer] benefits elected. Suggestion for patient to reach out to Employer HR to verify paperwork submitted and then call [address] [place] [address]. New Call reference# [id number]. Sonny ANGELES notified. Patient notified of above and will contact Employer. Patient also stated she is starting to feel better and may be able to dc home with her sister rather than ARU. Will have therapy continue to work with patient while finalizing dc plans. \".     Patient complains of significant bilateral lower extremity edema however her legs do not look edematous to me at all. I am thinking that her sensation of edema is related to neuropathic pain from her back problems. ROS x10: The patient also complains of severely impaired mobility and activities of daily living. Otherwise no new problems with vision, hearing, nose, mouth, throat, dermal, cardiovascular, GI, , pulmonary, musculoskeletal, psychiatric or neurological.        Vital signs:  BP (!) 149/51   Pulse 50   Temp 97.5 °F (36.4 °C) (Oral)   Resp 16   Ht 5' 5.98\" (1.676 m)   Wt 220 lb (99.8 kg)   SpO2 96%   BMI 35.53 kg/m²   I/O:   PO/Intake:    fair PO intake, continue to monitor closely for dehydration    Bowel/Bladder:   continent,    General:  Patient is well developed, adequately nourished, and    well kempt. HEENT:    PERRLA, hearing intact to loud voice, external inspection of ear and nose benign. Inspection of lips, tongue and gums benign  Musculoskeletal: No significant change in strength or tone. All joints stable. Inspection and palpation of digits and nails show no clubbing, cyanosis or inflammatory conditions. Neuro/Psychiatric: Affect: flat-  Alert and oriented to self and situation with no needed cues. No significant change in deep tendon reflexes or sensation  Lungs:  Diminished, CTA-B  . Respiration effort is normal at rest.   Heart:   S1 = S2,   RRR. Abdomen:  Soft, non-tender    Extremities:  Trace  lower extremity edema but no unusual tenderness.   Skin:   BUE bruises dt blood draws      Rehabilitation:  Physical Therapy:   Bed mobility:  Bed mobility  Supine to Sit: Stand by assistance (11/26/22 1318)  Sit to Supine: Stand by assistance (11/26/22 1318)  Bed Mobility Comments: NT - pt on commode pre-tx then up to chair post-tx to promote out of bed activity (11/27/22 1015)  Bed Mobility Training  Bed Mobility Training: No (11/29/22 9072)  Transfers:  Transfers  Sit to Stand: Minimal Assistance (11/27/22 1015)  Stand to Sit: assistance (11/28/22 1610)  Additional Comments: Simulated ADLs as above, limited d/t fatigue and weakness, difficulty with standing balance as well as decreased maintenance of figure 4 at EOB (11/28/22 1610)  Toilet Transfers  Toilet - Technique: Stand step (11/28/22 1611)  Equipment Used: Standard bedside commode (11/28/22 1611)  Toilet Transfer: Contact guard assistance (11/28/22 1611)  Toilet Transfers Comments: Increased effort (11/28/22 1611)          Speech Therapy:            Diet/Swallow:                   COGNITION  OT:    SP:           Lab/X-ray studies reviewed, analyzed and discussed with patient and staff:   Recent Results (from the past 24 hour(s))   CBC with Auto Differential    Collection Time: 11/30/22  5:08 AM   Result Value Ref Range    WBC 10.4 4.8 - 10.8 K/uL    RBC 3.54 (L) 4.20 - 5.40 M/uL    Hemoglobin 11.3 (L) 12.0 - 16.0 g/dL    Hematocrit 33.4 (L) 37.0 - 47.0 %    MCV 94.5 79.4 - 94.8 fL    MCH 32.0 (H) 27.0 - 31.3 pg    MCHC 33.8 33.0 - 37.0 %    RDW 13.3 11.5 - 14.5 %    Platelets 954 (H) 629 - 400 K/uL    Neutrophils % 77.3 %    Lymphocytes % 16.4 %    Monocytes % 5.6 %    Eosinophils % 0.3 %    Basophils % 0.4 %    Neutrophils Absolute 8.1 (H) 1.4 - 6.5 K/uL    Lymphocytes Absolute 1.7 1.0 - 4.8 K/uL    Monocytes Absolute 0.6 0.2 - 0.8 K/uL    Eosinophils Absolute 0.0 0.0 - 0.7 K/uL    Basophils Absolute 0.0 0.0 - 0.2 K/uL   Basic Metabolic Panel    Collection Time: 11/30/22  5:08 AM   Result Value Ref Range    Sodium 144 135 - 144 mEq/L    Potassium 3.8 3.4 - 4.9 mEq/L    Chloride 106 95 - 107 mEq/L    CO2 28 20 - 31 mEq/L    Anion Gap 10 9 - 15 mEq/L    Glucose 81 70 - 99 mg/dL    BUN 25 (H) 8 - 23 mg/dL    Creatinine 1.70 (H) 0.50 - 0.90 mg/dL    Est, Glom Filt Rate 33.6 (L) >60    Calcium 9.0 8.5 - 9.9 mg/dL     Previous extensive, complex labs, notes and diagnostics reviewed and analyzed.      ALLERGIES:    Allergies as of 11/23/2022 - Fully Reviewed 11/23/2022   Allergen Reaction Noted    Ace inhibitors Other (See Comments) 10/18/2012      (please also verify by checking [facility])     Complex Physical Medicine & Rehab Issues Assess & Plan:   Severe abnormality of gait and mobility and impaired self-care and ADL's secondary to   cauda equina syndrome. Updated functional and medical status reassessed regarding patients ability to participate in therapies and patient found to be able to participate in:          acute intensive comprehensive inpatient rehabilitation program including PT/OT to improve balance, ambulation, ADLs, and to improve the P/AROM. It is my opinion that they will be able to tolerate 3 hours of therapy a day and benefit from it at an acute level. I again discussed acute rehab with the patient and verify that the patient is able and willing to participate in 3 hours of therapy a day. Rehab and Acute Care Case Management has also reinforced this expectation. Will continue to follow to attempt to get patient to the most efficient but most effective level of care will be in their best interest.  Continue to focus on energy conservation heart rate and blood pressure monitoring before during and after therapy endurance and consistency of function. Bowel constipation   and Bladder dysfunction   overactive, neurogenic bladder:  frequent toileting, ambulate to bathroom with assistance, check post void residuals. Check for C.difficile x1 if >2 loose stools in 24 hours, continue bowel & bladder program.  Monitor for UTI symptoms including lethargy and confusion    Moderate low back pain and generalized OA pain: reassess pain every shift and prior to and after each therapy session, give prn Tylenol   and consider scheduled Tylenol, modalities prn in therapy, consider Lidoderm, K-pad prn. Skin healing    breakdown   risk:  continue pressure relief program.  Daily skin exams and reports from nursing.     Severe fatigue due to immobility and nutritional deficits: continue to monitor closely for dehydration   Add vitamin B12 vitamin D and CoQ10 titrate dosing and add protein supplementation with low carb content. Complex discharge planning:   Discussed with care team-last 24 hour events noted. I will continue to follow along and reassess functional and medical status as we strive to improve patient's functional and medical outcomes progressing to the most efficient and lowest level of care. Complex Active General Medical Issues that complicate care:     1. Principal Problem:    Lumbar stenosis with neurogenic claudication  Active Problems:    Weakness    Paraparesis of both lower limbs (HCC)    Inflammatory arthritis    Cardiomyopathy ([facility]    Sleep disorder    Cauda equina syndrome (HCC)    Bilateral foot-drop    Dehydration    Hypertension    Depression  Resolved Problems:    * No resolved hospital problems. *          Events and functional changes in the past 24 hours reviewed improvements in functional status are encouraging       Focus of today's plan-   is agreeable to acute rehab she is certainly in need of acute rehab for her spinal cord injury and we will continue to monitor her bowel and bladder function and educate her regarding her neuropathic pain and problems related to her spinal cord issue. She seems to be unaware of most of these issues at present.       Stefan Pisano D.O., PM&R     Attending    [address]

## 2022-11-30 NOTE — PROGRESS NOTES
Physical Therapy Missed Treatment   Facility/Department: [facility] MED SURG [id number]    NAME: Mary Lee    : 1960 (58 y.o.)  MRN: [id number]    Account: [de-identified]  Gender: female    Chart reviewed, attempted PT at 11:13. Patient unavailable 2° to:    [] Hold per nsg request    [] Pt declined    [] Nsg notified   [] Other notified    [x] Pt. . off floor for test/procedure. Pt currently at ultrasound. [] Pt. Unavailable       Will attempt PT treatment again at earliest convenience.       Electronically signed by Quoc Bunn PTA on 22 at 11:14 AM EST

## 2022-12-01 VITALS
HEIGHT: 66 IN | DIASTOLIC BLOOD PRESSURE: 55 MMHG | OXYGEN SATURATION: 99 % | HEART RATE: 72 BPM | TEMPERATURE: 98.1 F | SYSTOLIC BLOOD PRESSURE: 121 MMHG | BODY MASS INDEX: 35.36 KG/M2 | WEIGHT: 220 LBS | RESPIRATION RATE: 18 BRPM

## 2022-12-01 PROCEDURE — 99231 SBSQ HOSP IP/OBS SF/LOW 25: CPT | Performed by: PHYSICAL MEDICINE & REHABILITATION

## 2022-12-01 PROCEDURE — 6370000000 HC RX 637 (ALT 250 FOR IP): Performed by: INTERNAL MEDICINE

## 2022-12-01 PROCEDURE — 97116 GAIT TRAINING THERAPY: CPT

## 2022-12-01 PROCEDURE — 97110 THERAPEUTIC EXERCISES: CPT

## 2022-12-01 RX ORDER — PREDNISONE 10 MG/1
TABLET ORAL
Qty: 21 TABLET | Refills: 0 | Status: SHIPPED | OUTPATIENT
Start: 2022-12-01

## 2022-12-01 RX ORDER — ALLOPURINOL 100 MG/1
50 TABLET ORAL DAILY
Qty: 30 TABLET | Refills: 3 | Status: SHIPPED | OUTPATIENT
Start: 2022-12-01

## 2022-12-01 RX ADMIN — ALLOPURINOL 50 MG: 100 TABLET ORAL at 09:55

## 2022-12-01 RX ADMIN — Medication 500 MG: at 09:59

## 2022-12-01 RX ADMIN — METOPROLOL TARTRATE 25 MG: 25 TABLET, FILM COATED ORAL at 09:58

## 2022-12-01 RX ADMIN — Medication 1000 UNITS: at 09:56

## 2022-12-01 RX ADMIN — PREDNISONE 40 MG: 20 TABLET ORAL at 09:58

## 2022-12-01 RX ADMIN — HYDRALAZINE HYDROCHLORIDE 25 MG: 25 TABLET, FILM COATED ORAL at 09:57

## 2022-12-01 NOTE — DISCHARGE SUMMARY
Discharge Summary    Date: 12/1/2022  Patient Name: Raul Doss    YOB: 1960     Age: 58 y.o. Admit Date: 11/23/2022  Discharge Date: 12/1/2022  Discharge Condition: [address]    Admission Diagnosis  Dehydration [E86.0]; Weakness [R53.1]; Generalized weakness [R53.1]; Paresthesia of foot, bilateral [R20.2]      Discharge Diagnosis  Principal Problem:    Lumbar stenosis with neurogenic claudication  Active Problems:    Weakness    Paraparesis of both lower limbs (HCC)    Inflammatory arthritis    Cardiomyopathy ([facility] [address]    Sleep disorder    Cauda equina syndrome (HCC)    Bilateral foot-drop    Dehydration    Hypertension    Depression  Resolved Problems:    * No resolved hospital problems. [facility] AND [facility] Stay  Narrative of Hospital Course:  Patient comes with polyarthritis and weakness in the lower extremity MRI of the spine showed  severe spinal canal stenosis at L3-L4 patient was evaluated by neurosurgery and did not want to do surgery at this time. Patient improved with steroids. Patient also started on allopurinol for elevated uric acids causing gout. Patient is to follow-up with nephrology as outpatient for CKD. No folate or B12 def  CRP elevated at 213, sed rate 95  Anti-SSA and anti-SSB negative  MPO negative  Uric acid 8.5  Rheumatoid arthritis panel with weak positive IgG antibodies  DWIGHT positive    Consultants:  IP CONSULT TO ORTHOPEDIC SURGERY  IP CONSULT TO NEUROLOGY  IP CONSULT TO NEUROSURGERY  IP CONSULT TO PHYSICAL MEDICINE REHAB  IP CONSULT TO NEPHROLOGY    Surgeries/procedures Performed:      Treatments:            Discharge Plan/Disposition:  Home    Hospital/Incidental Findings Requiring Follow Up:    Patient Instructions:    Diet:    Activity:  For number of days (if applicable): Other Instructions:    Provider Follow-Up:   No follow-ups on file.      Significant Diagnostic Studies:    Recent Labs:  Admission on 11/23/2022  No results displayed because visit has over 200 results. ------------    Radiology last 7 days:  XR ANKLE LEFT (MIN 3 VIEWS)    Result Date: 11/25/2022  No fracture or dislocation. Minimal plantar heel spur. XR ANKLE RIGHT (MIN 3 VIEWS)    Result Date: 11/25/2022  Negative study of the right ankle. XR FOOT LEFT (MIN 3 VIEWS)    Result Date: 11/25/2022  Hilus valgus deformity no fracture or dislocation     XR FOOT RIGHT (MIN 3 VIEWS)    Result Date: 11/25/2022  Marked hallux valgus deformity. MRI LUMBAR SPINE WO CONTRAST    Result Date: 11/25/2022  1. Severe spinal canal stenosis and moderate to severe left neural foraminal narrowing at L3-4 secondary to grade 1 anterolisthesis, disc bulge, facet arthropathy and thickening of the ligamentum flavum. 2. Moderate spinal canal stenosis and severe right neural foraminal narrowing at L4-5 secondary to a disc bulge, facet arthropathy and thickening of the ligamentum flavum. 3. Mild spinal canal stenosis at L1-L2 and L2-L3, as described above. 4. Mild bilateral neural foraminal narrowing at L5-S1     US RETROPERITONEAL LIMITED    Result Date: 11/30/2022  Kidneys are least questionably atrophic in size without acute findings. No hydronephrosis     US DUP LOWER EXTREMITIES BILATERAL VENOUS    Result Date: 11/25/2022  No evidence of DVT in either lower extremity.         [unfilled]    Discharge Medications    Current Discharge Medication List    START taking these medications    allopurinol (ZYLOPRIM) 100 MG tablet  Take 0.5 tablets by mouth daily  Qty: 30 tablet Refills: 3    predniSONE (DELTASONE) 10 MG tablet  Please take prednisone 20 mg po daily x 7 days then prednisone 10 mg po daily x 7 days then stop  Qty: 21 tablet Refills: 0          Current Discharge Medication List        Current Discharge Medication List    CONTINUE these medications which have NOT CHANGED    atorvastatin (LIPITOR) 20 MG tablet  Take 20 mg by mouth daily    hydrALAZINE (APRESOLINE) 25 MG tablet  Take 25 mg by mouth in the morning and 25 mg in the evening. metoprolol tartrate (LOPRESSOR) 25 MG tablet  Take 25 mg by mouth 2 times daily    benzonatate (TESSALON PERLES) 100 MG capsule  Take 2 capsules by mouth 3 times daily as needed for Cough  Qty: 30 capsule Refills: 0    vitamin D (CHOLECALCIFEROL) 1000 UNIT TABS tablet  Take 1,000 Units by mouth daily    calcium carbonate (OSCAL) 500 MG TABS tablet  Take 500 mg by mouth daily    Multiple Vitamins-Minerals (MULTI COMPLETE PO)  Take 1 tablet by mouth daily. Current Discharge Medication List        Time Spent on Discharge:  minutes were spent in patient examination, evaluation, counseling as well as medication reconciliation, prescriptions for required medications, discharge plan, and follow up.     Electronically signed by Pedrito Arnold MD on 12/1/22 at 10:57 AM EST   Pt needs to FU with rheumatologist as outpt  Overtime on dc summary was 45 min  Overtime on dc summary was 45 min

## 2022-12-01 NOTE — PROGRESS NOTES
Physical Therapy Med Surg Daily Treatment Note  [facility]  [facility]       NAME: Lacey Elliott  : 1960 (58 y.o.)  MRN: [id number]  CODE STATUS: Full Code    Date of Service: 2022    Patient Diagnosis(es): Dehydration [E86.0]  Weakness [R53.1]  Generalized weakness [R53.1]  Paresthesia of foot, bilateral [R20.2]   Chief Complaint   Patient presents with    Illness     Pt has been feeling sick for 3 days (pt having chills, fever, and diarrhea). Pt has gotten out of bed for 3 days      Patient Active Problem List    Diagnosis Date Noted    Dehydration 2022    Cardiomyopathy ([facility] [address] 2022    Migraine headache 2022    Sleep disorder 2022    Tricuspid valve insufficiency 2022    Cauda equina syndrome ([address] 2022    Bilateral foot-drop 2022    Paraparesis of both lower limbs ([address] 2022    Lumbar stenosis with neurogenic claudication 2022    Inflammatory arthritis 2022    Weakness 2022    Nonrheumatic aortic (valve) insufficiency 2022    Rheumatic tricuspid insufficiency 2022    Ventricular premature depolarization 2022    High cholesterol     Hypertension     Depression     Family history of ischemic heart disease     Palpitations         Past Medical History:   Diagnosis Date    ACE-inhibitor cough     Colon polyp     Depression     Diverticulosis     Family history of ischemic heart disease     High cholesterol     Hypertension     Palpitations     Vaginal dryness      Past Surgical History:   Procedure Laterality Date    BACK SURGERY      disk removal    CARDIAC CATHETERIZATION      Had infusion of wrong fluids with ablation. ..flash pulmonary edema. Rory Mering on vent x 4 hours in ICU. ..cardiac cath to follow negative    ENDOMETRIAL ABLATION      TONSILLECTOMY      WISDOM TOOTH EXTRACTION         Chart Reviewed: Yes  Patient assessed for rehabilitation services?: Yes  Family / Caregiver Present: No    Restrictions:  Restrictions/Precautions: Fall Risk    SUBJECTIVE:   Subjective: \"A few weeks ago I used to be a normal person\"    Pain  Pain: Pt denies pre/ post     OBJECTIVE:             Transfers  Sit to Stand: Stand by assistance  Stand to Sit: Stand by assistance  Comment: VC's for safe hand placement w/ good follow through. Ambulation  Surface: Level tile  Device: Rolling Walker  Assistance: Contact guard assistance;Minimal assistance  Quality of Gait: decreased b/l foot clearance and step length, heavy UE use on [employer], flexed forward posture  Distance: 150'                   PT Exercises  A/AROM Exercises: STS x 5  Static Standing Balance Exercises: standing FT, FA x 1' , Staggered stance BLE advanced x30\"          Activity Tolerance  Activity Tolerance: Patient tolerated treatment well;Patient limited by endurance          ASSESSMENT   Assessment: Pt continues to progress towards goals, being able to ambulate 150' this date with [employer]. Pt had increased fatigue throughout but was able to complete all activities without issue. pt had increased rest periods throughout tx. Pt presents with mild instability in the RLE which becomes more prevalent with staggered stance. Discharge Recommendations:  Continue to assess pending progress, Therapy recommended at discharge         Goals  Short Term Goals  Short Term Goal 1: Pt will be able to complete all transfers with SBA. Short Term Goal 2: Pt will demonstrate Fair+ static and dynamic stnading balance. Short Term Goal 3: Pt will be able to ambulate >/= 48' with LRD with good stabiltiy and foot clearance CGA. Short Term Goal 4: Improve geno LE strength to increase ease with all transfers and mobility. PLAN    General Plan: 1 time a day 3-6 times a week  Safety Devices  Type of Devices:  All fall risk precautions in place, Call light within reach, Chair alarm in place, Left in chair     AMPA (6 CLICK) BASIC MOBILITY  AM-PAC Inpatient Mobility Raw Score : 17 Therapy Time   Individual   Time In 2188   Time Out 0927   Minutes 29     Timed Code Treatment Minutes: 34 Minutes       Braden Locke [place], 12/01/22 at 9:52 AM         Definitions for assistance levels  Independent = pt does not require any physical supervision or assistance from another person for activity completion. Device may be needed.   Stand by assistance = pt requires verbal cues or instructions from another person, close to but not touching, to perform the activity  Minimal assistance= pt performs 75% or more of the activity; assistance is required to complete the activity  Moderate assistance= pt performs 50% of the activity; assistance is required to complete the activity  Maximal assistance = pt performs 25% of the activity; assistance is required to complete the activity  Dependent = pt requires total physical assistance to accomplish the task

## 2022-12-01 NOTE — FLOWSHEET NOTE
Pt. Has been calm and cooperative waiting to be discharged. Pt. Received her meds from Raina Mcbride that was brought to her bedside. Pt.'s tele was removed and sent back to the monitor room. Discharge instructions were gone over with patient .

## 2022-12-01 NOTE — PROGRESS NOTES
CLINICAL PHARMACY NOTE: MEDS TO BEDS    Total # of Prescriptions Filled: 2   The following medications were delivered to the patient:  Allopurinol 100 mg Tab  Prednisone 10 mg Tab    Additional Documentation:

## 2022-12-01 NOTE — CARE COORDINATION
Quality round completed with care management team. Pt plan is to go home with daughter. Denies any needs at this time. LSW will follow.      Electronically signed by KARTHIK Guevara on 12/1/2022 at 10:29 AM

## 2022-12-01 NOTE — CONSULTS
[facility] [address]                      [address], [address] [facility]                                  CONSULTATION    PATIENT NAME: Kathleen Peterson                 :        1960  MED REC NO:   [id number]                            ROOM:       [id number]  ACCOUNT NO:   [de-identified]                           ADMIT DATE: 2022  PROVIDER:     Jesus Douglas DO    CONSULT DATE:  2022    HISTORY OF PRESENT ILLNESS:  A 58year-old admitted to the hospital for  neurological changes of her lower extremities. The patient was having  numbness and pain of her lower _____ and feet. The patient has been  unable to get out of bed initially and is able do since then. She does  have a history of hyperlipidemia and hypertension. She is being seen  for renal function and azotemia. The patient had a serum creatinine of  1.6 and GFR of 36 mL/minute. At the time of my evaluation, the patient  had a creatinine 1.7 and GFR 34 mL/minute. She denies any history of  long-term kidney abnormalities, however, has been told by Dr. Sarah Montanez  recently on office visit that she does not have normal renal function. She has not been told this by her primary care doctor. She denies any  family history of kidney disease. There is no history of nonsteroidal  anti-inflammatory medication use. She denies any gross hematuria,  urinary tract infections or dysuria. The patient has had no nausea,  vomiting or diarrhea. The patient has no arthralgias, rashes. The  patient does also have a history of questionable autoimmune disease. However, recent lab with ANCA anti-double stranded DNA C3 and C4 have  been normal.  She does also has a long-term history of hypertension and  family history of ischemic heart disease. PAST MEDICAL HISTORY:  Depression, diverticulosis, hypertension,  hyperlipidemia.     PAST SURGICAL HISTORY:  Lumbar disc surgery, cardiac catheterization,  endometrial ablation, tonsil and adenoidectomy. ALLERGIES TO MEDICATIONS:  Would be ACE INHIBITORS. MEDICATIONS:  Medicines at the time of her admission to the hospital;  Lipitor, Imodium, Tessalon, Apresoline, Lopressor, vitamin D,  Roxicodone, Zyloprim, and Deltasone. SOCIAL HISTORY:  No smoking or alcohol. The patient is . PHYSICAL EXAMINATION  VITAL SIGNS:  5 feet 6 inches and 220 pounds. Blood pressure is 160/80,  heart rate 70, respirations 18, afebrile. HEENT:  Normocephalic. Pupils reactive to light. Sclerae are clear. NECK:  Supple. No JVD or adenopathy. CHEST:  Lungs are clear. No wheezing, rales or rhonchi. No accessory  muscle use. CARDIOVASCULAR:  Regular, 1/6 systolic murmur. ABDOMEN:  _____ guarding or rigidity. Bowel sounds are present. No  pain in the lumbar region. EXTREMITIES:  Showed trace to 1+ edema of the ankles. No calf  tenderness. No cyanosis. The patient moves all limbs. IMPRESSION:  CKD IIIA, lumbar stenosis with pain, hypertension,  hyperlipidemia, depression, overweight, questionable autoimmune disease. PLAN:  Ultrasound of the kidneys. Check urine for albumin and  creatinine ratio, previous region labs for anti-DNA, ANCA complements  were all normal.  The patient was told to take no further nonsteroidal  anti-inflammatory medications and follow me in the office in 1 week. No  use of ACE/ARB, there is an allergy to these medications.         Patrick Chen DO    D: 11/30/2022 15:02:45       T: 11/30/2022 15:08:41     [email or web address]  Job#: [id number]     Doc#: [id number]    CC:

## 2022-12-01 NOTE — PROGRESS NOTES
Nephrology Progress Note    Assessment:  57 y/o female w/ history s/f HTN, HLD, depression who presented for bilateral feet parethesias and difficulty ambulating. Found to have severe spinal canal stenosis at L3-L4, evaluated by neurosurgery w/ patient not wanting to do surgery at this time. CKD stage III: etiology unclear, ?  Related to HTN however w/ her symptoms and UA w/ blood and prot c/f GN (notably did have LE and bact as well raising c/f UTI), renal U/S w/ borderline atrophic kidneys, serologies showed DWIGHT +, neg ANCA, neg RF, neg SSA/SSB, had Scr in the 1/4-1.6 range dating as far back as 2017  Anemia  Severe lumbar spinal stenosis  HTN     Plan:  - ok to discharge from renal standpoint w/ f/u in 2 weeks       Patient Active Problem List:     Hypertension     Depression     Family history of ischemic heart disease     Palpitations     High cholesterol     Weakness     Paraparesis of both lower limbs (HCC)     Lumbar stenosis with neurogenic claudication     Inflammatory arthritis     Cardiomyopathy ([facility] [address]     Migraine headache     Nonrheumatic aortic (valve) insufficiency     Sleep disorder     Rheumatic tricuspid insufficiency     Tricuspid valve insufficiency     Ventricular premature depolarization     Cauda equina syndrome (HCC)     Bilateral foot-drop     Dehydration      Subjective:  Admit Date: 11/23/2022    Interval History: Scr elevated yesterday, no labs today, symptoms improving w/ steroids    Medications:  Scheduled Meds:   atorvastatin  20 mg Oral Nightly    calcium elemental  500 mg Oral Daily    hydrALAZINE  25 mg Oral BID    metoprolol tartrate  25 mg Oral BID    Vitamin D  1,000 Units Oral Daily    allopurinol  50 mg Oral Daily    predniSONE  40 mg Oral Daily     Continuous Infusions:    CBC:   Recent Labs     11/29/22  0526 11/30/22  0508   WBC 9.7 10.4   HGB 11.0* 11.3*   * 510*     CMP:    Recent Labs     11/29/22  0526 11/30/22  0508    144   K 3.9 3.8    106   CO2 25 28   BUN 25* 25*   CREATININE 1.44* 1.70*   GLUCOSE 77 81   CALCIUM 9.0 9.0   LABGLOM 41.0* 33.6*     Troponin: No results for input(s): TROPONINI in the last 72 hours. BNP: No results for input(s): BNP in the last 72 hours. INR: No results for input(s): INR in the last 72 hours. Lipids: No results for input(s): CHOL, LDLDIRECT, TRIG, HDL, AMYLASE, LIPASE in the last 72 hours. Liver: No results for input(s): AST, ALT, ALKPHOS, PROT, LABALBU, BILITOT in the last 72 hours. Invalid input(s): BILDIR  Iron:  No results for input(s): IRONS, FERRITIN in the last 72 hours. Invalid input(s): LABIRONS  Urinalysis: No results for input(s): UA in the last 72 hours.     Objective:  Vitals: BP (!) 121/55   Pulse 72   Temp 98.1 °F (36.7 °C) (Oral)   Resp 18   Ht 5' 5.98\" (1.676 m)   Wt 220 lb (99.8 kg)   SpO2 97%   BMI 35.53 kg/m²    Wt Readings from Last 3 Encounters:   11/23/22 220 lb (99.8 kg)   04/01/22 200 lb (90.7 kg)   01/22/20 175 lb (79.4 kg)      24HR INTAKE/OUTPUT:  No intake or output data in the 24 hours ending 12/01/22 1119    General: alert, in no apparent distress  HEENT: normocephalic, atraumatic, anicteric  Neck: supple, no mass  Lungs: non-labored respirations, clear to auscultation bilaterally  Heart: regular rate and rhythm, no murmurs or rubs  Abdomen: soft, non-tender, non-distended  Ext: no cyanosis, no peripheral edema  Neuro: alert and oriented, no gross abnormalities      Electronically signed by Silvia Preciado MD, MD

## 2022-12-01 NOTE — PROGRESS NOTES
Subjective: The patient complains of severe acute on chronic progressive fatigue and BLE weakness and mild low back pain partially relieved by rest, PT, OT and meds   and exacerbated by exertion and recent illness. I am concerned about patients medical complexities including:  Principal Problem:    Lumbar stenosis with neurogenic claudication  Active Problems:    Weakness    Paraparesis of both lower limbs (HCC)    Inflammatory arthritis    Cardiomyopathy ([address]    Sleep disorder    Cauda equina syndrome (HCC)    Bilateral foot-drop    Dehydration    Hypertension    Depression  Resolved Problems:    * No resolved hospital problems. *      .    Reviewed recent nursing note and discussed current status and planned care with acute care providers, \"Call placed to [facility] and per [address] [place] [address] Natty, prior-auth representative, patient's insurance coverage ends December 1st, 2022. Turnaround time for Auth is 15 days, so precert could not be started as no coverage would be available. Call reference# [id number]. Call placed to [facility]. Electronically signed by Jeramy Dixon RN on 11/29/22 at 12:43 PM EST  Spoke with patient regarding insurance and she states she has [employer]. Call placed to son who was able to verify [employer] benefit paid through 12/31/22 and provide Member ID of [id number] on paperwork. Call placed back to [facility] and spoke with [place] Natty again. None of the [address] [place] [address] are showing any proof of payment received or [employer] benefits elected. Suggestion for patient to reach out to Employer HR to verify paperwork submitted and then call [address] [place] [address]. New Call reference# [id number]. Edilia ANGELES notified. Patient notified of above and will contact Employer. Patient also stated she is starting to feel better and may be able to dc home with her sister rather than ARU. Will have therapy continue to work with patient while finalizing dc plans. \".     Patient complains of significant bilateral lower extremity edema however her legs do not look edematous to me at all. I am thinking that her sensation of edema is related to neuropathic pain from her back problems. ROS x10: The patient also complains of severely impaired mobility and activities of daily living. Otherwise no new problems with vision, hearing, nose, mouth, throat, dermal, cardiovascular, GI, , pulmonary, musculoskeletal, psychiatric or neurological.        Vital signs:  BP (!) 142/61   Pulse 65   Temp 98.1 °F (36.7 °C) (Oral)   Resp 18   Ht 5' 5.98\" (1.676 m)   Wt 220 lb (99.8 kg)   SpO2 97%   BMI 35.53 kg/m²   I/O:   PO/Intake:    fair PO intake, continue to monitor closely for dehydration    Bowel/Bladder:   continent,    General:  Patient is well developed, adequately nourished, and    well kempt. HEENT:    PERRLA, hearing intact to loud voice, external inspection of ear and nose benign. Inspection of lips, tongue and gums benign  Musculoskeletal: No significant change in strength or tone. All joints stable. Inspection and palpation of digits and nails show no clubbing, cyanosis or inflammatory conditions. Neuro/Psychiatric: Affect: flat-  Alert and oriented to self and situation with no needed cues. No significant change in deep tendon reflexes or sensation  Lungs:  Diminished, CTA-B  . Respiration effort is normal at rest.   Heart:   S1 = S2,   RRR. Abdomen:  Soft, non-tender    Extremities:  Trace  lower extremity edema but no unusual tenderness.   Skin:   BUE bruises dt blood draws      Rehabilitation:  Physical Therapy:   Bed mobility:  Bed mobility  Supine to Sit: Stand by assistance (11/26/22 7840)  Sit to Supine: Stand by assistance (11/26/22 4943)  Bed Mobility Comments: NT - pt on commode pre-tx then up to chair post-tx to promote out of bed activity (11/27/22 1015)  Bed Mobility Training  Bed Mobility Training: Yes (11/30/22 7117)  Interventions: Verbal cues (11/30/22 9787)  Rolling: Stand-by assistance (11/30/22 1557)  Supine to Sit: Contact-guard assistance (11/30/22 1557)  Scooting: Stand-by assistance (11/30/22 1557)  Transfers:  Transfers  Sit to Stand: Stand by assistance (12/01/22 0931)  Stand to Sit: Stand by assistance (12/01/22 0931)  Comment: VC's for safe hand placement w/ good follow through.  (12/01/22 8736)  Transfer Training  Transfer Training: Yes (11/30/22 1557)  Overall Level of Assistance: Stand-by assistance;Contact-guard assistance (11/29/22 0935)  Interventions: Verbal cues (11/30/22 1557)  Sit to Stand: Contact-guard assistance (11/30/22 1557)  Stand to Sit: Contact-guard assistance (11/30/22 1557)  Stand Pivot Transfers: Minimum assistance (11/29/22 0935)  Bed to Chair: Minimum assistance (11/29/22 0935)  Gait:   Ambulation  Surface: Level tile (12/01/22 0932)  Device: [facility] (12/01/22 0932)  Assistance: Contact guard assistance;Minimal assistance (12/01/22 0932)  Quality of Gait: decreased b/l foot clearance and step length, heavy UE use on [employer], flexed forward posture (12/01/22 0932)  Distance: 150' (12/01/22 0932)  Gait Training: Yes (11/30/22 1557)  Overall Level of Assistance: Contact-guard assistance (11/30/22 1557)  Distance (ft): 20 Feet (10' x 2) (11/30/22 1557)  Assistive Device: Hue Montalvo, rolling (11/30/22 1557)  Interventions: Verbal cues (11/30/22 1557)  Base of Support: Widened (11/30/22 1557)  Speed/Elyse: Pace decreased (< 100 feet/min) (11/30/22 1557)  Step Length: Left shortened;Right shortened (11/30/22 1557)  Gait Abnormalities: Trunk sway increased;Decreased step clearance (11/30/22 1557)  Right Side Weight Bearing: As tolerated (11/29/22 0935)  Left Side Weight Bearing: As tolerated (11/29/22 0935)  Stairs:     W/C mobility:       Occupational Therapy:   Hand Dominance: Left  ADL  Feeding: Independent (11/28/22 1610)  Grooming: Supervision (11/28/22 1610)  UE Bathing: Setup (11/28/22 1610)  LE Bathing: Minimal assistance (11/28/22 1610)  UE Dressing: Setup (11/28/22 1610)  LE Dressing: Moderate assistance (11/28/22 1610)  Toileting: Contact guard assistance (11/28/22 1610)  Additional Comments: Simulated ADLs as above, limited d/t fatigue and weakness, difficulty with standing balance as well as decreased maintenance of figure 4 at EOB (11/28/22 1610)  Toilet Transfers  Toilet - Technique: Stand step (11/28/22 1611)  Equipment Used: Standard bedside commode (11/28/22 1611)  Toilet Transfer: Contact guard assistance (11/28/22 1611)  Toilet Transfers Comments: Increased effort (11/28/22 1611)          Speech Therapy:            Diet/Swallow:                   COGNITION  OT:    SP:           Lab/X-ray studies reviewed, analyzed and discussed with patient and staff:   Recent Results (from the past 24 hour(s))   Microalbumin / Creatinine Urine Ratio    Collection Time: 11/30/22 12:51 PM   Result Value Ref Range    Microalbumin, Random Urine <1.20 Not Established mg/dL    Creatinine, Ur 37.0 Not Established mg/dL    Microalbumin Creatinine Ratio see below 0.0 - 30.0 mg/G     Previous extensive, complex labs, notes and diagnostics reviewed and analyzed. ALLERGIES:    Allergies as of 11/23/2022 - Fully Reviewed 11/23/2022   Allergen Reaction Noted    Ace inhibitors Other (See Comments) 10/18/2012      (please also verify by checking [facility])     Complex Physical Medicine & Rehab Issues Assess & Plan:   Severe abnormality of gait and mobility and impaired self-care and ADL's secondary to   cauda equina syndrome. Updated functional and medical status reassessed regarding patients ability to participate in therapies and patient found to be able to participate in:       Home with [facility]-her function has improved greatly over the past 24 hours.     Will continue to follow to attempt to get patient to the most efficient but most effective level of care will be in their best interest.  Continue to focus on energy conservation heart rate and blood pressure monitoring before during and after therapy endurance and consistency of function. Bowel constipation   and Bladder dysfunction   overactive, neurogenic bladder:  frequent toileting, ambulate to bathroom with assistance, check post void residuals. Check for C.difficile x1 if >2 loose stools in 24 hours, continue bowel & bladder program.  Monitor for UTI symptoms including lethargy and confusion    Moderate low back pain and generalized OA pain: reassess pain every shift and prior to and after each therapy session, give prn Tylenol   and consider scheduled Tylenol, modalities prn in therapy, consider Lidoderm, K-pad prn. Skin healing    breakdown   risk:  continue pressure relief program.  Daily skin exams and reports from nursing. Severe fatigue due to immobility and nutritional deficits: continue to monitor closely for dehydration   Add vitamin B12 vitamin D and CoQ10 titrate dosing and add protein supplementation with low carb content. Complex discharge planning:   Discussed with care team-last 24 hour events noted. I will continue to follow along and reassess functional and medical status as we strive to improve patient's functional and medical outcomes progressing to the most efficient and lowest level of care. Complex Active General Medical Issues that complicate care:     1. Principal Problem:    Lumbar stenosis with neurogenic claudication  Active Problems:    Weakness    Paraparesis of both lower limbs (HCC)    Inflammatory arthritis    Cardiomyopathy ([address]    Sleep disorder    Cauda equina syndrome (HCC)    Bilateral foot-drop    Dehydration    Hypertension    Depression  Resolved Problems:    * No resolved hospital problems.  *          Events and functional changes in the past 24 hours reviewed improvements in functional status are encouraging            Michael Goldberg D.O., PM&R     Attending    [address]

## 2022-12-01 NOTE — TELEPHONE ENCOUNTER
Anny Gillis from [address] [place] [address] called regarding patient's discharge. She will add to discharge papers to have lumbar XR done prior to one month follow up appt with Dr Roberto Bullard.  Patient is scheduled to follow up with Dr Roberto Bullard 12/20/22 @9:00 AM

## 2022-12-02 ENCOUNTER — CARE COORDINATION (OUTPATIENT)
Dept: CARE COORDINATION | Age: 62
End: 2022-12-02

## 2022-12-02 DIAGNOSIS — M21.372 BILATERAL FOOT-DROP: Primary | ICD-10-CM

## 2022-12-02 DIAGNOSIS — M21.371 BILATERAL FOOT-DROP: Primary | ICD-10-CM

## 2022-12-02 DIAGNOSIS — M48.062 LUMBAR STENOSIS WITH NEUROGENIC CLAUDICATION: ICD-10-CM

## 2022-12-02 NOTE — CARE COORDINATION
[facility] Care Transitions Initial Follow Up Call    Call within 2 business days of discharge: Yes    Care Transition Nurse contacted the patient by telephone to perform post hospital discharge assessment. Verified name and  with patient as identifiers. Provided introduction to self, and explanation of the Care Transition Nurse role. Patient: Denice Hartman Patient : 1960   MRN: [id number]  Reason for Admission: -22 Lumbar Stenosis with Neurogenic Claudication  Discharge Date: 22 RARS: Readmission Risk Score: 10.4      Last Discharge  [address]       Date Complaint Diagnosis Description Type Department Provider    22 Illness Dehydration . .. ED to Hosp-Admission (Discharged) (ADMITTED) Raji Boykin MD; Emanuel Dandy, D... Was this an external facility discharge? No Discharge Facility: [facility]    Challenges to be reviewed by the provider   Additional needs identified to be addressed with provider: No  none               Method of communication with provider: none. Spoke with Pt who reports no residual symptoms since discharge. Pt denies foot pain/paresthesia. Denies fatigue, weakness, trouble ambulating. Pt states appetite is good and is drinking plenty of fluids; normal elimination patterns. Medication Review completed, 1111F order placed. Pt has ALL Hosp F/U appts scheduled. Pt denies Transportation, Home, or Medication needs. CTN information given, will continue to follow. START taking:  allopurinol (ZYLOPRIM)  predniSONE (DELTASONE)    Procedures and Other Instructions  Per Dr Renae Denise-- Do Not Take NSAIDS ( Motrin, Advil, Aleve,  Naproxsyn Etc. Tayler Mcdermott )    Instructions  Recheck one month Dr Sylvia Santos. [phone] surgical discussion    Care Transition Nurse reviewed discharge instructions, medical action plan, and red flags with patient who verbalized understanding.  The patient was given an opportunity to ask questions and does not have any further questions or concerns at this time. Were discharge instructions available to patient? Yes. Reviewed appropriate site of care based on symptoms and resources available to patient including: PCP  Specialist  When to call [address] The patient agrees to contact the PCP office for questions related to their healthcare. Advance Care Planning:   Does patient have an Advance Directive: not on file. Medication reconciliation was performed with patient, who verbalizes understanding of administration of home medications. Medications reviewed, 1111F entered: yes    Was patient discharged with a pulse oximeter? no    Non-face-to-face services provided:  Obtained and reviewed discharge summary and/or continuity of care documents    Offered patient enrollment in the Remote Patient Monitoring (RPM) program for in-home monitoring: Patient is not eligible for RPM program.    Care Transitions 24 Hour Call    Schedule Follow Up Appointment with PCP: Completed  Do you have a copy of your discharge instructions?: Yes  Do you have all of your prescriptions and are they filled?: Yes  Have you been contacted by a [facility] Pharmacist?: No  Have you scheduled your follow up appointment?: Yes  How are you going to get to your appointment?: Car - family or friend to transport  Do you feel like you have everything you need to keep you well at home?: Yes  Care Transitions Interventions  No Identified Needs         Follow Up  Future Appointments   Date Time Provider Lionel Loya   12/9/2022  4:30 PM Jaison Guzman MD [facility]   12/20/2022  9:00 AM Frank Morin MD [address]   3/20/2023  1:30 PM MD [facility] -       Care Transition Nurse provided contact information. Plan for follow-up call in 5-7 days based on severity of symptoms and risk factors.   Plan for next call: follow-up appointment-PCP, Neurosurgery  medication management-taking as prescribed, changes    Jil Thompson LPN

## 2022-12-04 NOTE — PROGRESS NOTES
Physical Therapy  Facility/Department: [facility] MED SURG [id number]/[id number]  Physical Therapy Discharge      NAME: Gokul Aden    : 1960 (58 y.o.)  MRN: [id number]    Account: [de-identified]  Gender: female      Patient has been discharged from acute care hospital. DC patient from current PT program.      Electronically signed by Lyly Morales PT on 22 at 12:31 PM EST

## 2022-12-05 NOTE — ADT AUTH CERT
Utilization Reviews       CR 11/27 by Delana Landau, RN       Review Status Review Entered   In Primary 12/5/2022 1247       Created By   Delana Landau, RN      Criteria Review   DATE: 11/27/2022 CD 5 (done per request- past date on MCG- review note being placed )        RELEVANT BASELINES: (lab values, vitals, o2 amount/delivery, etc.)  RA , Creat 1.43 2017 historically     PERTINENT UPDATES:  She still has considerable pain in the ankles though her left leg appears to be improved considerably in terms of strength and pain. She has back issues as well     Patient she is sitting in the chair is not able to move her ankles bilaterally even the right side. swellind is less     VITALS:  98.1, 18, 80, 143/62, pox 95 % on RA      ABNL/PERTINENT LABS/RADIOLOGY/DIAGNOSTIC STUDIES:  Bun 28  Creat 1.27  Gfr 47.6  Rbc 3.45  H/h 10.7/ 32.5  Mchc 32.8  Plt 424  Neut 6.7        PHYSICAL EXAM:  Ext: Bilateral tophi great toes, tender to palpation. Bilateral ankle edema and swelling, pain with palpation and restricted range of motion bilaterally due to pain     Motor: Foot drop notable on the right though this is secondary to immobility of the ankle and not a true neurogenic foot drop. Swelling is notable and second tenderness Multiple the ankles. The left leg shows improvement since yesterday patient is quite hyperreflexic throughout     Patient swelling is decreasing and she has no movement at her ankle bilaterally.   Right is somewhat restricted but much better     MD CONSULTS/ASSESSMENT AND PLAN:  Medical impression        Active Hospital Problems     Diagnosis Date Noted    Paraparesis of both lower limbs ([facility] [address] [G82.20] 11/26/2022       Priority: Medium    Lumbar stenosis with neurogenic claudication [M48.062] 11/26/2022       Priority: Medium    Inflammatory arthritis [M19.90] 11/26/2022       Priority: Medium    Weakness [R53.1] 11/23/2022       Priority: Medium      B/L LE pain and swelling, Crystallopathy suspected  -Presenting with exquisite pain to the lower extremities bilaterally and inability to ambulate, inflammatory markers are elevated, also with leukocytosis  -Gout suspected, could also be pseudogout, uric acid is 3.6  -Orthopedics consulted-no plan for procedures.  -Continue 40 mg prednisone daily, start renally dosed allopurinol, will hold colchicine until renal function is improved  -DWIGHT and rheumatoid panel ordered-pending  -MRI lumbar ordered by neurology-MRI showed spinal stenosis that is severe, seen by neurosurgeon, recommended surgery but patient is hesitant. UTI  -Resume rocephin   -Noted pansensitive E. coli on the urine studies. Will treat for 3 days. SHLOMO  -Cr 1.6-->1.2, suspect decreased intake at home as patient was reportedly bedbound for several days.  -Continue IV fluid hydration and follow serial BMP     Leukocytosis: Suspect reactive to gout, monitor, if patient has fever obtain blood cultures x2 and panculture  Dispo- may need SNF vs rehab given lower ext weakness      Neuro Impression  Polyarthritis with improvement notable in her swelling and ambulatory status as well as movement of her ankle on the right. The leg on the left has improved considerably. Patient is on prednisone. She is still somewhat hyperreflexic but no other ascending paralysis is notable. Inflammatory markers are pending for vasculitis. We will keep an eye on this and continue to follow. MEDICATIONS:  IV Rocephin 1000 mg iv q24h   Prednisone 40 mg po daily   IV LR at 125 cc/hr continuous   Prn imodium 2 mg x1      ORDERS:  OT eval and treat      PT eval and treat      Inpatient consult to Physical Medicine Rehab      ADULT DIET; Regular      Inpatient consult to Orthopedic Surgery      ADULT DIET;  Regular      Telemetry monitoring         PT/OT/SLP/CM ASSESSMENT OR NOTES:  ABRAHAM 11/27  Bed mobility  Bed Mobility Comments: NT - pt on commode pre-tx then up to chair post-tx to promote out of bed activity     Transfers  Sit to Stand: Minimal Assistance  Stand to Sit: Contact guard assistance  Comment: VC's for safe hand placement w/ good follow through - pt's sister present on pt's other side for pt comfort, however no physical assist needed     Ambulation  Surface: Level tile  Device: Rolling Walker  Assistance: Contact guard assistance;Minimal assistance  Quality of Gait: decreased b/l foot clearance and step length, heavy UE use on [employer], flexed forward posture  Distance: 3ft from [facility] to chair     Administered/reviewed supine and seated HEP w/ pt verbalizing good understanding      Assessment: Pt w/ excellent follow through of cues for technique and sequencing w/ transfers and gait. Pt limited by overall endurance, however is motivated to progress. Pt verbalizes good understanding of supine and seated HEP. Therapy Prognosis: Good      [facility] (6 CLICK) BASIC MOBILITY  AM-PAC Inpatient Mobility Raw Score : 13         CR 11/26 by Tony Monterroso RN       Review Status Review Entered   In Primary 12/5/2022 1238       Created By   Tony Monterroso RN      Criteria Review   DATE: 11/26/2022 CD 4 (done per request- past date on MCG- review note being placed )        RELEVANT BASELINES: (lab values, vitals, o2 amount/delivery, etc.)  RA , Creat 1.43 2017 historically     PERTINENT UPDATES:  Remains on IV meds and Fluids as charted below      VITALS:  97.7, HR 70, RR 18, bp 156/62, pox 98 % on RA      ABNL/PERTINENT LABS/RADIOLOGY/DIAGNOSTIC STUDIES:  Bun 30  Creat 1.36  Anion gap 8  Gfr 43.9  HS crp 213.2  Wbc 11.5  Rbc 3.36  H/h 10.6/ 31.6  Mch 31.5  Neut 9.7      PHYSICAL EXAM:  Ext: Bilateral tophi great toes, tender to palpation. Bilateral ankle edema and swelling, pain with palpation and restricted range of motion bilaterally due to pain     Motor: Foot drop notable on the right though this is secondary to immobility of the ankle and not a true neurogenic foot drop.   Swelling is notable and second tenderness Multiple the ankles. The left leg shows improvement since yesterday patient is quite hyperreflexic throughout               MD CONSULTS/ASSESSMENT AND PLAN:  Medical impression         Paraparesis of both lower limbs ([facility] [address] [G82.20] 11/26/2022       Priority: Medium    Lumbar stenosis with neurogenic claudication [M48.062] 11/26/2022       Priority: Medium    Inflammatory arthritis [M19.90] 11/26/2022       Priority: Medium    Weakness [R53.1] 11/23/2022       Priority: Medium      B/L LE pain and swelling, Crystallopathy suspected  -Presenting with exquisite pain to the lower extremities bilaterally and inability to ambulate, inflammatory markers are elevated, also with leukocytosis  -Gout suspected, could also be pseudogout, uric acid is 3.6  -Orthopedics consulted-no plan for procedures.  -Continue 40 mg prednisone daily, start renally dosed allopurinol, will hold colchicine until renal function is improved  -DWIGHT and rheumatoid panel ordered-pending  -MRI lumbar ordered by neurology-MRI showed spinal stenosis that is severe, seen by neurosurgeon, recommended surgery but patient is hesitant. UTI  -Resume rocephin   -Noted pansensitive E. coli on the urine studies. Will treat for 3 days. SHLOMO  -Cr 1.6-->1.2, suspect decreased intake at home as patient was reportedly bedbound for several days.  -Continue IV fluid hydration and follow serial BMP     Leukocytosis: Suspect reactive to gout, monitor, if patient has fever obtain blood cultures x2 and panculture     Ortho impression     Patient feeling much better today on oral prednisone/premedication     Bilateral ankle: Mild swelling. Improved since yesterday. Assessment/plan. 1.  Bilateral ankle pain. Improving  2. Foot drop: Will require additional neurological work-up     Neuro impression  Polyarthritis of rheumatologic origin with swelling of the ankles with fixation of the ankles and pain.   Findings not quite history of Guillain-Barré syndrome given the underlying clinical profile and examination. I had suspected a lumbar canal stenosis given her underlying findings and patient has severe lumbar spinal canal stenosis. Neurosurgery is on consult surgery is contemplated. Her arthritic evaluation is still in progress and we await some rheumatological markers and patient continue on steroids for now. His left leg is improved considerably. Neuro surgery impression  Impression:   Severe L3-4 canal stenosis with cauda equina compression correlates with her paraparesis  Acute inflammatory bilateral ankle arthropathy with associated paraparesis right greater than left with profound 1/5 foot drop        Plan:   Evaluate and treat for her inflammatory process including gout rheumatoid arthritis lupus and so forth. Right and bilateral L3-4 microdissection decompression after medical assessment     The surgical/medical procedure, indications and risks have been discussed. There is a low chance of having any type of risk, but risks are still present including serious risks as pain, bleeding, infection, death, paralysis, sensory loss, bladder bowel and sexual dysfunction, chance of recurrence and so forth. Surgery is not a guarantee of normalcy. We cannot undo any permanent damage already done. We cannot change the course of any of the other medical diseases. I have discussed alternative procedures, risks and benefits. I have answered all questions. There is understanding and agreement to proceed. MEDICATIONS:  IV rocephin 1000 mg q24h   Prednisone 40 mg po daily   IV LR at 125 cc/hr   Prn tylenol 650 mg po prn x1      ORDERS:  OT eval and treat      PT eval and treat      Inpatient consult to Physical Medicine Rehab      ADULT DIET; Regular      Inpatient consult to Orthopedic Surgery      ADULT DIET;  Regular      Telemetry monitoring         PT/OT/SLP/CM ASSESSMENT OR NOTES:  Pt 11/26  Stand by assistance  Sit to Supine: Stand by assistance     Transfers  Sit to Stand: Moderate Assistance;Minimal Assistance;Contact guard assistance (Decreasing A by 3rd STS)  Stand to Sit: Minimal Assistance;Contact guard assistance      Discharge Recommendations: Continue to assess pending progress, Therapy recommended at discharge     Assessment: Pt evaluated for mobility needs while in the inpatient setting. Pt demonstrates significant decreased strength in geno LEs with Rt LE worse than Lt. Pt able to complete bed mobility but requires increased A to complete STS transfers. Pt requires less A after educated on technique to complete STS transfers. Pt able to take a single step FWD and lateral but unable to ambulate during evaluation. Pt would benefit from further skilled PT to improve her strength, balance and safety with all mobility.   Requires PT Follow-Up: Yes      [facility] (6 CLICK) [id number] Mobility Raw Score : 13               Dehydration - Care Day 7 (11/29/2022) by Tony Monterroso RN       Review Status Review Entered   Completed 11/30/2022 0842       Created By   Tony Monterroso RN      Criteria Review      Care Day: 7 Care Date: 11/29/2022 Level of Care: Intermediate Care    Guideline Day 2    Level Of Care    (X) Floor    11/30/2022 8:42 AM EST by Alejandra Ramos      intermediate care unit   tele ordered - tele SR    Clinical Status    (X) * Hypotension absent    11/30/2022 8:42 AM EST by Joann Ramos      97.5 (36.4) 18 85 139/79 100 % on RA    ( ) * Electrolyte abnormalities absent or improved    11/30/2022 8:42 AM EST by Joann Ramos      bun 25  creat 1.44  gfr 41  rbc 3.47  h/h 11/32.6  mch 31.6  plt 474   neut 7.3    ( ) * Cause of dehydration requiring inpatient treatment absent    11/30/2022 8:42 AM EST by Joann Ramos      stool x1 documented this day    (X) * Renal function at baseline, stable or improved    (X) * Mental status at baseline or improved    (X) * Vomiting absent or improved    (X) * Diarrhea absent or improved    Activity    (X) Activity as tolerated    11/30/2022 8:42 AM EST by Rossana Costello      up as annita   pt/ot ordered   Motor: Foot drop notable on the right though this is secondary to immobility of the ankle and not a true neurogenic foot drop. quite hyperreflexic throughout    Routes    (X) IV or oral fluids    11/30/2022 8:42 AM EST by Kulwant Ramos      oral hydration   iv LR at 125 cc/hr continuous remains    (X) IV or oral medications    11/30/2022 8:42 AM EST by Kulwant Ramos      oral meds   prednisone 40 mg po daily   prn none this day    (X) Diet as tolerated    Interventions    (X) Electrolytes    * Milestone   Additional Notes   DATE: 11/29/2022 CD 7  (date requested)          RELEVANT BASELINES: (lab values, vitals, o2 amount/delivery, etc.)   Navneet RUSH 1.43 2017 historically      PERTINENT UPDATES:   Remains on IVF's    Working with Pt/ Ot- for rehab eval/ will need precert   May need rehab. Neurosurgery offered procedure but patient is reluctant to do it. VITALS:   Charted [place] [facility]      ABNL/PERTINENT LABS/RADIOLOGY/DIAGNOSTIC STUDIES:   Charted in [facility] [place]      PHYSICAL EXAM:   Motor: Foot drop notable on the right though this is secondary to immobility of the ankle and not a true neurogenic foot drop. quite hyperreflexic throughout   Lungs:  Breath sounds clear to auscultation. MD CONSULTS/ASSESSMENT AND PLAN:   Medical impression   * (Principal) Lumbar stenosis with neurogenic claudication 11/29/2022 Yes      Overview Signed 11/29/2022  8:05 AM by Fidel Alvarenga DO          Severe spinal canal stenosis and moderate to severe left neural foraminal narrowing at L3-4 secondary to grade 1 anterolisthesis, disc bulge, facet arthropathy and thickening of the ligamentum flavum.              Weakness 11/26/2022 Yes     Paraparesis of both lower limbs ([address] 11/26/2022 Yes     Inflammatory arthritis 11/26/2022 Yes     Cardiomyopathy ([address] 11/29/2022 Yes     Sleep disorder 11/29/2022 Yes     Cauda equina syndrome ([address] 11/28/2022 Yes Bilateral foot-drop 11/28/2022 Yes     Dehydration 11/29/2022 Yes     Hypertension 11/29/2022 Yes     Depression 11/29/2022 Yes   CKD   Admitted with bilateral lower extremity weakness due to in part due to arthropathy likely inflammatory in addition to severe spinal stenosis, as seen by neurosurgery and neurology. Awaiting PT/OT. May need rehab. Neurosurgery offered procedure but patient is reluctant to do it. Assessment & Plan   11/28: Rehab referral possible discharge to acute rehab versus home today. 04/19: Awaiting pre-CERT to acute rehab. Dany aj  for lower extremity edema. Echo noted with normal EF this year         Neuro impression   Polyarthralgia, improved   COnt prednisone   Severe lumbar canal stenosis with possible plans for future surgical intervention. Follow-up with neurosurgery outpatient   Discharge plan in progress         MEDICATIONS:   Charted in MCG      ORDERS:   OT eval and treat    PT eval and treat    Inpatient consult to Physical Medicine Rehab    ADULT DIET; Regular    Inpatient consult to Orthopedic Surgery    ADULT DIET; Regular    Telemetry monitoring          PT/OT/SLP/CM ASSESSMENT OR NOTES:   PT 11/29     Bed Mobility Training   Bed Mobility Training: No       Transfer Training   Transfer Training: Yes   Overall Level of Assistance: Stand-by assistance;Contact-guard assistance   Interventions: Safety awareness training; Tactile cues   Sit to Stand: Stand-by assistance;Contact-guard assistance   Stand to Sit: Stand-by assistance;Contact-guard assistance   Stand Pivot Transfers: Minimum assistance   Bed to Chair: Minimum assistance       Gait Training: Yes   Overall Level of Assistance: Contact-guard assistance   Distance (ft): 5 Feet x 2 with break   Assistive Device: Walker, rolling   Interventions: Safety awareness training; Tactile cues   Base of Support: Widened   Speed/Elyse: Delayed   Step Length: Right shortened   Gait Abnormalities: Step to gait   Right Side Weight Bearing: As tolerated   Left Side Weight Bearing: As tolerated      [facility] (6 CLICK) BASIC MOBILITY   AM-PAC Inpatient Mobility Raw Score : 17       CM 11/29  REHAB APPROVAL PENDING AND PRECERT NEEDED. PLAN TO START THE PRECERT TODAY.                Dehydration - Care Day 6 (11/28/2022) by Frank Lantigua RN       Review Status Review Entered   Completed 11/29/2022 1639       Created By   Frank Lantigua RN      Criteria Review      Care Day: 6 Care Date: 11/28/2022 Level of Care: Intermediate Care    Guideline Day 2    Level Of Care    (X) Floor    11/29/2022 4:39 PM EST by Peggy Ramos      intermediate care unit   tele ordered    Clinical Status    (X) * Hypotension absent    11/29/2022 4:39 PM EST by Joann Ramos      97.7 (36.5) 18 61 184/72 95 % on RA    ( ) * Electrolyte abnormalities absent or improved    11/29/2022 4:39 PM EST by Joann Ramos      k 5  bun 28  creat 1.39  gfr 42.8  rbc 3.86  hct 36,7  mcv 95  mch 31.7  plt 455  neut 7    ( ) * Cause of dehydration requiring inpatient treatment absent    (X) * Renal function at baseline, stable or improved    (X) * Mental status at baseline or improved    (X) * Vomiting absent or improved    (X) * Diarrhea absent or improved    11/29/2022 4:39 PM EST by Joann Ramos      stool x2 this day    Activity    (X) Activity as tolerated    Routes    (X) IV or oral fluids    11/29/2022 4:39 PM EST by Joann Ramos      iv LR at 125 cc continues    (X) IV or oral medications    11/29/2022 4:39 PM EST by Peggy Ramos      prednisone 40 mg po daily  prn Imodium 2 mg x1    (X) Diet as tolerated    Medications    (X) Possible antidiarrheal agent    * Milestone   Additional Notes   DATE: 11/28/2022 CD 6  (date requested)          RELEVANT BASELINES: (lab values, vitals, o2 amount/delivery, etc.)   Navneet RUSH 1.43 2017 historically      PERTINENT UPDATES:   Remains on IVF's continuous as charted in MCG    Working with pt/ot- see notes- for rehab eval       VITALS:    charted in [facility] ABNL/PERTINENT LABS/RADIOLOGY/DIAGNOSTIC STUDIES:    [place] [facility]       PHYSICAL EXAM:   Abdomen: Abdomen is soft. Bowel sounds are normal   Motor: Foot drop notable on the right though this is secondary to immobility of the ankle and not a true neurogenic foot drop. quite hyperreflexic throughout         MD CONSULTS/ASSESSMENT AND PLAN:   Medical impression   * (Principal) Lumbar stenosis with neurogenic claudication 11/26/2022 Yes     Weakness 11/26/2022 Yes     Paraparesis of both lower limbs ([address] 11/26/2022 Yes     Inflammatory arthritis 11/26/2022 Yes     Cauda equina syndrome ([address] 11/28/2022 Yes     Bilateral foot-drop 11/28/2022 Yes   CKD   Admitted with bilateral lower extremity weakness due to in part due to arthropathy likely inflammatory in addition to severe spinal stenosis, as seen by neurosurgery and neurology. Awaiting PT/OT. May need rehab. Neurosurgery offered procedure but patient is reluctant to do it. Assessment & Plan   11/28: Rehab referral possible discharge to acute rehab versus home today. Neuro  impression   11/28/2022:   Bilateral lower extremity pain and swelling, improved   Polyarthralgia   CRP elevated at 213, sed rate 95   Anti-SSA and anti-SSB negative   MPO negative   Uric acid 8.5   Rheumatoid arthritis panel with weak positive IgG antibodies   DWIGHT positive   Patient continues on prednisone 40 mg daily   No B12 or folate deficiencies   MRI of the lumbar spine with severe spinal canal stenosis at L3-L4   Neurosurgery is following with plans for possible microdissection once medically stable. Rehab pre-CERT pending               MEDICATIONS:   Charted in MCG       ORDERS:   OT eval and treat    PT eval and treat    Inpatient consult to Physical Medicine Rehab    ADULT DIET;  Regular    ITelemetry monitoring          PT/OT/SLP/CM ASSESSMENT OR NOTES:   Pt 11/27   Bed mobility   Bed Mobility Comments: NT - pt on commode pre-tx then up to chair post-tx to promote out of bed activity       Transfers   Sit to Stand: Minimal Assistance   Stand to Sit: Contact guard assistance   Comment: VC's for safe hand placement w/ good follow through - pt's sister present on pt's other side for pt comfort, however no physical assist needed       Ambulation   Surface: Level tile   Device: Rolling Walker   Assistance: Contact guard assistance;Minimal assistance   Quality of Gait: decreased b/l foot clearance and step length, heavy UE use on [employer], flexed forward posture   Distance: 3ft from [facility] to chair       Administered/reviewed supine and seated HEP w/ pt verbalizing good understanding       Activity Tolerance   Activity Tolerance: Patient tolerated treatment well;Patient limited by endurance           ASSESSMENT    Assessment: Pt w/ excellent follow through of cues for technique and sequencing w/ transfers and gait. Pt limited by overall endurance, however is motivated to progress. Pt verbalizes good understanding of supine and seated HEP. Therapy Prognosis: Good    [facility] (6 CLICK) BASIC MOBILITY   AM-PAC Inpatient Mobility Raw Score : 13       OT 11/28   Assessment/Discharge Disposition:   Assessment: Pt is a 58year old woman from home who [facility] to [facility] with the above deficits which impact her ability to perform ADLs and IADLs. Pt. limited d/t fatigue and weakness. Pt would benefit from continued OT to maximize independence and safety with ADL tasks.    Performance deficits / Impairments: Decreased ADL status, Decreased functional mobility , Decreased strength, Decreased endurance, Decreased balance, Decreased high-level IADLs   Prognosis: Good   Discharge Recommendations: Continue to assess pending progress   Decision Making: Medium Complexity   History: Pt's medical history is moderately complex   Exam: Pt. has 6 performance deficits   Assistance / Modification: Pt. requires min A       AMPAC (Six Click) Self care Score    How much help for putting on and taking off regular lower body clothing?: A Lot   How much help for Bathing?: A Lot   How much help for Toileting?: A Little   How much help for putting on and taking off regular upper body clothing?: A Little   How much help for taking care of personal grooming?: A Little   How much help for eating meals?: None   AM-PAC Inpatient Daily Activity Raw Score: 17   AM-PAC Inpatient ADL T-Scale Score : 37.26   ADL Inpatient CMS 0-100% Score: 50.11      CM 11/28  METW/PT TO ASSESS NEEDS AND DISCUSS DISCHARGE PLAN. PT WOULD PREFER  REHAB FOR GAIT AND STRENGTHENING. PT STATES, \"I KNOW I NEED THERAPY BEFORE RETURNING HOME BECAUSE I AM WEAK. \" SNF LIST PROVIDED AS A SECONDARY OPTION. FOC OFFERED. CM TO FOLLOW CLINICAL COURSE.

## 2022-12-08 ENCOUNTER — CARE COORDINATION (OUTPATIENT)
Dept: CASE MANAGEMENT | Age: 62
End: 2022-12-08

## 2022-12-08 NOTE — CARE COORDINATION
[facility] Care Transitions Follow Up Call    Care Transition Nurse contacted the patient by telephone to follow up after admission on 22. Patient: Denette Cogan  Patient : 1960   MRN: <[id number]>  Reason for Admission: Dehydration  Discharge Date: 22 RARS: Readmission Risk Score: 10.4      Needs to be reviewed by the provider   Additional needs identified to be addressed with provider: No  none             Method of communication with provider: none. Pt returned CTN's call. CTN spoke with pt for a sub care transition call. Pt admitted on 22 with c/o BLE swelling, weakness, and fatigue. Pt found to have severe spinal stenosis (L3-L4). Pt states that she is doing [employer] and denies any BLE pain, swelling, or numbness/tingling. Pt states that she is ambulating without any need for a cane or walker. She feels \"significantly improved\" as compared to when she first came to the hospital. Pt states that she continues to take her Prednisone as ordered. She states today is the last day that she takes the Prednisone 2 tabs. She states tomorrow she will start on the 1 tab daily x7 days. CTN reviewed HFU appts. Noted pt had cancelled her HFU with NS that was to be on . Pt states that she cancelled this appt until she recovers more. She states that she does intend on rescheduling. Pt reminded that she will need to obtain lumbar xrays prior to her f/u with NS. She voiced understanding and states that she has this information on her discharge AVS. Pt has a HFU with her pcp scheduled tomorrow, Dr. Denise Brown (neuro) on 3/20/22, and pt reports she was able to move up her appt with Dr. Yodit Holliday (renal) in Dec instead of . Pt voiced no needs/concerns at this time. Pt was agreeable to continued outreaches from the CTN.     Addressed changes since last contact:  none      Follow Up  Future Appointments   Date Time Provider Lionel Loya   2022  4:30 PM Wilbur Bonilla MD [facility] EMERGENCY [facility] AT [place] 3/20/2023  1:30 PM MD [facility] Neurology -       Care Transition Nurse reviewed medical action plan and red flags with patient and discussed any barriers to care and/or understanding of plan of care after discharge. Discussed appropriate site of care based on symptoms and resources available to patient including: PCP  Specialist  When to call 911. The patient agrees to contact the PCP office for questions related to their healthcare. Patients top risk factors for readmission: lack of knowledge about disease, level of motivation, medical condition-lumbar stenosis, cardiomyopathy, htn, hld, CKD, ?autoimmune disease per chart review, and multiple health system providers  Interventions to address risk factors: Scheduled appointment with PCP-12/9/22, Scheduled appointment with Specialist-See above. Noted that neuro recommended pt f/u with rheumatology as an OP, and Reviewed and followed up on pending diagnostic tests and treatments-f/u lumbar xrays prior to NS f/u appt. Pt aware. Care Transitions Subsequent and Final Call    Subsequent and Final Calls  Care Transitions Interventions  Other Interventions:             Care Transition Nurse provided contact information for future needs. Plan for follow-up call in 7-10 days based on severity of symptoms and risk factors. Plan for next call: symptom management-Any BLE edema, numbness/tingling or pain?   follow-up appointment-Has pt rescheduled NS f/u appt.  Remind pt to obtain lumbar xrays prior to NS appt    Zia March RN

## 2022-12-08 NOTE — CARE COORDINATION
[facility] Care Transitions Follow Up Call        Patient: Neto Trevizo  Patient : 1960   MRN: <[id number]>  Reason for Admission: Dehydration  Discharge Date: 22 RARS: Readmission Risk Score: 10.4      Attempter to reach the patient for subsequent Care Transition call. Message left with CTN's contact information requesting return phone call. Will attempt call again.        Follow Up  Future Appointments   Date Time Provider Lionel Loya   2022  4:30 PM Ceasar Valero MD [facility]   3/20/2023  1:30 PM Matthew Solorzano MD [facility] Neurology -       Matthew Solorzano RN

## 2022-12-09 ENCOUNTER — OFFICE VISIT (OUTPATIENT)
Dept: FAMILY MEDICINE CLINIC | Age: 62
End: 2022-12-09

## 2022-12-09 VITALS
OXYGEN SATURATION: 98 % | DIASTOLIC BLOOD PRESSURE: 86 MMHG | HEIGHT: 66 IN | TEMPERATURE: 98.2 F | BODY MASS INDEX: 35.2 KG/M2 | WEIGHT: 219 LBS | SYSTOLIC BLOOD PRESSURE: 138 MMHG | HEART RATE: 71 BPM

## 2022-12-09 DIAGNOSIS — M21.372 BILATERAL FOOT-DROP: ICD-10-CM

## 2022-12-09 DIAGNOSIS — Z23 NEEDS FLU SHOT: ICD-10-CM

## 2022-12-09 DIAGNOSIS — M48.062 LUMBAR STENOSIS WITH NEUROGENIC CLAUDICATION: ICD-10-CM

## 2022-12-09 DIAGNOSIS — I10 PRIMARY HYPERTENSION: ICD-10-CM

## 2022-12-09 DIAGNOSIS — M21.371 BILATERAL FOOT-DROP: ICD-10-CM

## 2022-12-09 DIAGNOSIS — Z12.31 SCREENING MAMMOGRAM FOR BREAST CANCER: ICD-10-CM

## 2022-12-09 DIAGNOSIS — Z09 HOSPITAL DISCHARGE FOLLOW-UP: Primary | ICD-10-CM

## 2022-12-09 DIAGNOSIS — G82.20 PARAPARESIS OF BOTH LOWER LIMBS (HCC): ICD-10-CM

## 2022-12-09 DIAGNOSIS — Z12.11 SCREEN FOR COLON CANCER: ICD-10-CM

## 2022-12-09 DIAGNOSIS — G83.4 CAUDA EQUINA SYNDROME (HCC): ICD-10-CM

## 2022-12-09 DIAGNOSIS — E86.0 DEHYDRATION: ICD-10-CM

## 2022-12-09 NOTE — PROGRESS NOTES
After obtaining consent, and per orders of Dr. Andrez Goodrich, injection of flu given in Left deltoid by Radha Rojas CMA (AAMA). Patient instructed to remain in clinic for 20 minutes afterwards, and to report any adverse reaction to me immediately. Vaccine Information Sheet, \"Influenza - Inactivated\"  given to Rob Class, or parent/legal guardian of  Rob Class and verbalized understanding. Patient responses:    Have you ever had a reaction to a flu vaccine? No  Are you able to eat eggs without adverse effects? Yes  Do you have any current illness? No  Have you ever had [place] Syndrome? No    Flu vaccine given per order. Please see immunization tab.

## 2022-12-09 NOTE — PROGRESS NOTES
Post-Discharge Transitional Care  Follow Up      Scott Farmer   YOB: 1960    Date of Office Visit:  12/9/2022  Date of Hospital Admission: 11/23/22  Date of Hospital Discharge: 12/1/22  Risk of hospital readmission (high >=14%. Medium >=10%) :Readmission Risk Score: 10.4      Care management risk score Rising risk (score 2-5) and Complex Care (Scores >=6): No Risk Score On File     Non face to face  following discharge, date last encounter closed (first attempt may have been earlier): 12/02/2022    Call initiated 2 business days of discharge: Yes    ASSESSMENT/PLAN:   Hospital discharge follow-up  -     [place] DISCHARGE MEDS RECONCILED W/ CURRENT OUTPATIENT MED LIST  Needs flu shot  -     Influenza, FLUCELVAX, (age 10 mo+), IM, Preservative Free, 0.5 mL  Screening mammogram for breast cancer  -     [facility] DIGITAL SCREEN W OR WO CAD BILATERAL; Future  Screen for colon cancer  -     Chelsie  Zeyad Calderon MD, Gastroenterology, [place]  Cauda equina syndrome [facility])  Paraparesis of both lower limbs ([facility] [address]  Dehydration  Bilateral foot-drop  Lumbar stenosis with neurogenic claudication  -     External Referral - Soila Bowman MD - Spine Surgery, Sports Med, Arthroscopic Surgery  Primary hypertension      She will f/u with nephrology and neurosurgery   Improving with steroids    Medical Decision Making: moderate complexity  No follow-ups on file. On this date 12/9/2022 I have spent 30 minutes reviewing previous notes, test results and face to face with the patient discussing the diagnosis and importance of compliance with the treatment plan as well as documenting on the day of the visit. Subjective:   HPI:  Follow up of Hospital problems/diagnosis(es): polyarthritis, weakness of legs, spinal stenosis    Inpatient course: Discharge summary reviewed- see chart.   Hospital Stay  Narrative of Hospital Course:  Patient comes with polyarthritis and weakness in the lower extremity MRI of the spine showed severe spinal canal stenosis at L3-L4 patient was evaluated by neurosurgery and did not want to do surgery at this time. Patient improved with steroids. Patient also started on allopurinol for elevated uric acids causing gout. Patient is to follow-up with nephrology as outpatient for CKD. No folate or B12 def  CRP elevated at 213, sed rate 95  Anti-SSA and anti-SSB negative  MPO negative  Uric acid 8.5  Rheumatoid arthritis panel with weak positive IgG antibodies  DWIGHT positive  Interval history/Current status:    Feeling much better, swelling down       Patient Active Problem List   Diagnosis    Hypertension    Depression    Family history of ischemic heart disease    Palpitations    High cholesterol    Weakness    Paraparesis of both lower limbs (HCC)    Lumbar stenosis with neurogenic claudication    Inflammatory arthritis    Cardiomyopathy ([facility] [address]    Migraine headache    Nonrheumatic aortic (valve) insufficiency    Sleep disorder    Rheumatic tricuspid insufficiency    Tricuspid valve insufficiency    Ventricular premature depolarization    Cauda equina syndrome (HCC)    Bilateral foot-drop    Dehydration       Medications listed as ordered at the time of discharge from hospital     Medication List            Accurate as of December 9, 2022  4:41 PM. If you have any questions, ask your nurse or doctor.                 CONTINUE taking these medications      allopurinol 100 MG tablet  Commonly known as: ZYLOPRIM  Take 0.5 tablets by mouth daily     atorvastatin 20 MG tablet  Commonly known as: LIPITOR     calcium carbonate 500 MG Tabs tablet  Commonly known as: OSCAL     hydrALAZINE 25 MG tablet  Commonly known as: APRESOLINE     metoprolol tartrate 25 MG tablet  Commonly known as: LOPRESSOR     MULTI COMPLETE PO     predniSONE 10 MG tablet  Commonly known as: DELTASONE  Please take prednisone 20 mg po daily x 7 days then prednisone 10 mg po daily x 7 days then stop     vitamin D 1000 UNIT Tabs tablet  Commonly known as: CHOLECALCIFEROL                Medications marked \"taking\" at this time  Outpatient Medications Marked as Taking for the 12/9/22 encounter (Office Visit) with Violeta Montana MD   Medication Sig Dispense Refill    allopurinol (ZYLOPRIM) 100 MG tablet Take 0.5 tablets by mouth daily 30 tablet 3    predniSONE (DELTASONE) 10 MG tablet Please take prednisone 20 mg po daily x 7 days then prednisone 10 mg po daily x 7 days then stop 21 tablet 0    atorvastatin (LIPITOR) 20 MG tablet Take 20 mg by mouth daily      hydrALAZINE (APRESOLINE) 25 MG tablet Take 25 mg by mouth in the morning and 25 mg in the evening. metoprolol tartrate (LOPRESSOR) 25 MG tablet Take 25 mg by mouth 2 times daily      vitamin D (CHOLECALCIFEROL) 1000 UNIT TABS tablet Take 1,000 Units by mouth daily      calcium carbonate (OSCAL) 500 MG TABS tablet Take 500 mg by mouth daily      Multiple Vitamins-Minerals (MULTI COMPLETE PO) Take 1 tablet by mouth daily. Medications patient taking as of now reconciled against medications ordered at time of hospital discharge: Yes    Otherwise physically feels healthy without sob/palpitations/chest pain/f/c/n/v/weight gain/weight loss/abd pain      Objective:    /86 (Site: Left Upper Arm)   Pulse 71   Temp 98.2 °F (36.8 °C)   Ht 5' 6\" (1.676 m)   Wt 219 lb (99.3 kg)   SpO2 98%   BMI 35.35 kg/m²   Physical Exam:  /86 (Site: Left Upper Arm)   Pulse 71   Temp 98.2 °F (36.8 °C)   Ht 5' 6\" (1.676 m)   Wt 219 lb (99.3 kg)   SpO2 98%   BMI 35.35 kg/m²     Gen: Well, NAD, Alert, Oriented x 3   HEENT: EOMI, eyes clear, MMM  Skin: without rash or jaundice  Neck: no significant lymphadenopathy or thyromegaly  Lungs: CTA B w/out Rales/Wheezes/Rhonchi, Good respiratory effort   Heart: RRR, S1S2, w/out M/R/G, non-displaced PMI   Ext: No C/C/E Bilaterally. Neuro: Neurovascularly intact w/ Sensory/Motor intact UE/LE Bilaterally.         An electronic signature was used to authenticate this note.   --Kelsea Vences MD

## 2022-12-16 ENCOUNTER — CARE COORDINATION (OUTPATIENT)
Dept: CARE COORDINATION | Age: 62
End: 2022-12-16

## 2022-12-21 ENCOUNTER — TELEPHONE (OUTPATIENT)
Dept: FAMILY MEDICINE CLINIC | Age: 62
End: 2022-12-21

## 2022-12-21 DIAGNOSIS — R26.81 GENERALLY UNSTEADY: Primary | ICD-10-CM

## 2022-12-21 NOTE — TELEPHONE ENCOUNTER
Pt calling asking for a referral for PT would like to go to [facility], [facility]..   States she is unsteady referral to evaluate and treat       [phone]

## 2022-12-29 ENCOUNTER — CARE COORDINATION (OUTPATIENT)
Dept: CARE COORDINATION | Age: 62
End: 2022-12-29

## 2022-12-29 PROBLEM — E86.0 DEHYDRATION: Status: RESOLVED | Noted: 2022-11-29 | Resolved: 2022-12-29

## 2023-03-24 ENCOUNTER — TELEPHONE (OUTPATIENT)
Dept: FAMILY MEDICINE CLINIC | Age: 63
End: 2023-03-24

## 2023-03-24 ENCOUNTER — TELEPHONE (OUTPATIENT)
Dept: GASTROENTEROLOGY | Age: 63
End: 2023-03-24

## 2023-07-11 ENCOUNTER — TELEPHONE (OUTPATIENT)
Dept: FAMILY MEDICINE CLINIC | Age: 63
End: 2023-07-11

## 2023-09-21 ENCOUNTER — TELEPHONE (OUTPATIENT)
Dept: FAMILY MEDICINE CLINIC | Age: 63
End: 2023-09-21

## 2025-03-25 NOTE — ED NOTES
Patient ambulates to Xray. Flu swab obtained and sent to lab.       Rush Mckenzie RN  01/22/20 2133 Calm